# Patient Record
Sex: MALE | Race: WHITE | NOT HISPANIC OR LATINO | Employment: OTHER | URBAN - METROPOLITAN AREA
[De-identification: names, ages, dates, MRNs, and addresses within clinical notes are randomized per-mention and may not be internally consistent; named-entity substitution may affect disease eponyms.]

---

## 2019-08-15 ENCOUNTER — AMBULATORY - HEALTHEAST (OUTPATIENT)
Dept: OTHER | Facility: CLINIC | Age: 64
End: 2019-08-15

## 2019-08-19 ENCOUNTER — AMBULATORY - HEALTHEAST (OUTPATIENT)
Dept: CARDIOLOGY | Facility: CLINIC | Age: 64
End: 2019-08-19

## 2019-08-19 DIAGNOSIS — R00.2 PALPITATIONS: ICD-10-CM

## 2021-05-31 ENCOUNTER — RECORDS - HEALTHEAST (OUTPATIENT)
Dept: ADMINISTRATIVE | Facility: CLINIC | Age: 66
End: 2021-05-31

## 2021-06-01 ENCOUNTER — RECORDS - HEALTHEAST (OUTPATIENT)
Dept: ADMINISTRATIVE | Facility: CLINIC | Age: 66
End: 2021-06-01

## 2021-06-16 PROBLEM — R00.2 PALPITATIONS: Status: ACTIVE | Noted: 2019-08-15

## 2021-06-16 PROBLEM — I10 BENIGN ESSENTIAL HYPERTENSION: Status: ACTIVE | Noted: 2019-08-15

## 2024-10-24 ENCOUNTER — HOSPITAL ENCOUNTER (EMERGENCY)
Facility: CLINIC | Age: 69
Discharge: HOME | End: 2024-10-24
Attending: EMERGENCY MEDICINE | Admitting: EMERGENCY MEDICINE
Payer: COMMERCIAL

## 2024-10-24 ENCOUNTER — APPOINTMENT (OUTPATIENT)
Dept: CT IMAGING | Facility: CLINIC | Age: 69
End: 2024-10-24
Attending: EMERGENCY MEDICINE
Payer: COMMERCIAL

## 2024-10-24 VITALS
RESPIRATION RATE: 20 BRPM | TEMPERATURE: 98.2 F | OXYGEN SATURATION: 95 % | BODY MASS INDEX: 35.94 KG/M2 | DIASTOLIC BLOOD PRESSURE: 80 MMHG | SYSTOLIC BLOOD PRESSURE: 154 MMHG | HEART RATE: 54 BPM | HEIGHT: 74 IN | WEIGHT: 280 LBS

## 2024-10-24 DIAGNOSIS — G89.29 ACUTE ON CHRONIC LOW BACK PAIN: ICD-10-CM

## 2024-10-24 DIAGNOSIS — M54.50 ACUTE ON CHRONIC LOW BACK PAIN: ICD-10-CM

## 2024-10-24 PROBLEM — M54.16 SPINAL STENOSIS OF LUMBAR REGION WITH RADICULOPATHY: Status: ACTIVE | Noted: 2017-07-24

## 2024-10-24 PROBLEM — Z87.81 HISTORY OF VERTEBRAL FRACTURE: Status: ACTIVE | Noted: 2020-12-31

## 2024-10-24 PROBLEM — G62.9 NEUROPATHY: Status: ACTIVE | Noted: 2020-12-31

## 2024-10-24 PROBLEM — I77.811 ABDOMINAL AORTIC ECTASIA (H): Status: ACTIVE | Noted: 2021-03-23

## 2024-10-24 PROBLEM — N40.0 BPH (BENIGN PROSTATIC HYPERPLASIA): Status: ACTIVE | Noted: 2019-03-22

## 2024-10-24 PROBLEM — D47.2 MGUS (MONOCLONAL GAMMOPATHY OF UNKNOWN SIGNIFICANCE): Status: ACTIVE | Noted: 2020-12-31

## 2024-10-24 PROBLEM — Z98.1 S/P LUMBAR SPINAL FUSION: Status: ACTIVE | Noted: 2017-06-01

## 2024-10-24 PROBLEM — M48.061 SPINAL STENOSIS OF LUMBAR REGION WITH RADICULOPATHY: Status: ACTIVE | Noted: 2017-07-24

## 2024-10-24 LAB
ALBUMIN SERPL BCG-MCNC: 4 G/DL (ref 3.5–5.2)
ALP SERPL-CCNC: 54 U/L (ref 40–150)
ALT SERPL W P-5'-P-CCNC: 15 U/L (ref 0–70)
ANION GAP SERPL CALCULATED.3IONS-SCNC: 7 MMOL/L (ref 7–15)
AST SERPL W P-5'-P-CCNC: 16 U/L (ref 0–45)
BASOPHILS # BLD AUTO: 0.1 10E3/UL (ref 0–0.2)
BASOPHILS NFR BLD AUTO: 2 %
BILIRUB SERPL-MCNC: 0.6 MG/DL
BUN SERPL-MCNC: 13.6 MG/DL (ref 8–23)
CALCIUM SERPL-MCNC: 9 MG/DL (ref 8.8–10.4)
CHLORIDE SERPL-SCNC: 103 MMOL/L (ref 98–107)
CREAT SERPL-MCNC: 0.85 MG/DL (ref 0.67–1.17)
EGFRCR SERPLBLD CKD-EPI 2021: >90 ML/MIN/1.73M2
EOSINOPHIL # BLD AUTO: 0.4 10E3/UL (ref 0–0.7)
EOSINOPHIL NFR BLD AUTO: 6 %
ERYTHROCYTE [DISTWIDTH] IN BLOOD BY AUTOMATED COUNT: 12.6 % (ref 10–15)
GLUCOSE SERPL-MCNC: 99 MG/DL (ref 70–99)
HCO3 SERPL-SCNC: 28 MMOL/L (ref 22–29)
HCT VFR BLD AUTO: 39.1 % (ref 40–53)
HGB BLD-MCNC: 13.7 G/DL (ref 13.3–17.7)
IMM GRANULOCYTES # BLD: 0.1 10E3/UL
IMM GRANULOCYTES NFR BLD: 1 %
LYMPHOCYTES # BLD AUTO: 1.8 10E3/UL (ref 0.8–5.3)
LYMPHOCYTES NFR BLD AUTO: 29 %
MCH RBC QN AUTO: 32 PG (ref 26.5–33)
MCHC RBC AUTO-ENTMCNC: 35 G/DL (ref 31.5–36.5)
MCV RBC AUTO: 91 FL (ref 78–100)
MONOCYTES # BLD AUTO: 0.6 10E3/UL (ref 0–1.3)
MONOCYTES NFR BLD AUTO: 10 %
NEUTROPHILS # BLD AUTO: 3.2 10E3/UL (ref 1.6–8.3)
NEUTROPHILS NFR BLD AUTO: 52 %
NRBC # BLD AUTO: 0 10E3/UL
NRBC BLD AUTO-RTO: 0 /100
PLATELET # BLD AUTO: 302 10E3/UL (ref 150–450)
POTASSIUM SERPL-SCNC: 4.1 MMOL/L (ref 3.4–5.3)
PROT SERPL-MCNC: 7.6 G/DL (ref 6.4–8.3)
RBC # BLD AUTO: 4.28 10E6/UL (ref 4.4–5.9)
SODIUM SERPL-SCNC: 138 MMOL/L (ref 135–145)
WBC # BLD AUTO: 6.1 10E3/UL (ref 4–11)

## 2024-10-24 PROCEDURE — 36415 COLL VENOUS BLD VENIPUNCTURE: CPT | Performed by: EMERGENCY MEDICINE

## 2024-10-24 PROCEDURE — 80053 COMPREHEN METABOLIC PANEL: CPT | Performed by: EMERGENCY MEDICINE

## 2024-10-24 PROCEDURE — 250N000011 HC RX IP 250 OP 636: Performed by: EMERGENCY MEDICINE

## 2024-10-24 PROCEDURE — 74177 CT ABD & PELVIS W/CONTRAST: CPT

## 2024-10-24 PROCEDURE — 99285 EMERGENCY DEPT VISIT HI MDM: CPT | Mod: 25 | Performed by: EMERGENCY MEDICINE

## 2024-10-24 PROCEDURE — 250N000009 HC RX 250: Performed by: EMERGENCY MEDICINE

## 2024-10-24 PROCEDURE — 99284 EMERGENCY DEPT VISIT MOD MDM: CPT | Performed by: EMERGENCY MEDICINE

## 2024-10-24 PROCEDURE — 85025 COMPLETE CBC W/AUTO DIFF WBC: CPT | Performed by: EMERGENCY MEDICINE

## 2024-10-24 PROCEDURE — 250N000013 HC RX MED GY IP 250 OP 250 PS 637: Performed by: EMERGENCY MEDICINE

## 2024-10-24 RX ORDER — IBUPROFEN 600 MG/1
600 TABLET, FILM COATED ORAL ONCE
Status: COMPLETED | OUTPATIENT
Start: 2024-10-24 | End: 2024-10-24

## 2024-10-24 RX ORDER — OXYCODONE HYDROCHLORIDE 5 MG/1
5 TABLET ORAL ONCE
Status: COMPLETED | OUTPATIENT
Start: 2024-10-24 | End: 2024-10-24

## 2024-10-24 RX ORDER — ACETAMINOPHEN 500 MG
1000 TABLET ORAL ONCE
Status: COMPLETED | OUTPATIENT
Start: 2024-10-24 | End: 2024-10-24

## 2024-10-24 RX ORDER — OXYCODONE HYDROCHLORIDE 5 MG/1
5 TABLET ORAL EVERY 6 HOURS PRN
Qty: 12 TABLET | Refills: 0 | Status: SHIPPED | OUTPATIENT
Start: 2024-10-24 | End: 2024-10-27

## 2024-10-24 RX ORDER — IOPAMIDOL 755 MG/ML
137 INJECTION, SOLUTION INTRAVASCULAR ONCE
Status: COMPLETED | OUTPATIENT
Start: 2024-10-24 | End: 2024-10-24

## 2024-10-24 RX ADMIN — SODIUM CHLORIDE 74 ML: 9 INJECTION, SOLUTION INTRAVENOUS at 10:44

## 2024-10-24 RX ADMIN — ACETAMINOPHEN 1000 MG: 500 TABLET ORAL at 09:55

## 2024-10-24 RX ADMIN — OXYCODONE HYDROCHLORIDE 5 MG: 5 TABLET ORAL at 11:26

## 2024-10-24 RX ADMIN — IBUPROFEN 600 MG: 600 TABLET, FILM COATED ORAL at 09:55

## 2024-10-24 RX ADMIN — IOPAMIDOL 137 ML: 755 INJECTION, SOLUTION INTRAVENOUS at 10:44

## 2024-10-24 ASSESSMENT — COLUMBIA-SUICIDE SEVERITY RATING SCALE - C-SSRS
2. HAVE YOU ACTUALLY HAD ANY THOUGHTS OF KILLING YOURSELF IN THE PAST MONTH?: NO
1. IN THE PAST MONTH, HAVE YOU WISHED YOU WERE DEAD OR WISHED YOU COULD GO TO SLEEP AND NOT WAKE UP?: NO
6. HAVE YOU EVER DONE ANYTHING, STARTED TO DO ANYTHING, OR PREPARED TO DO ANYTHING TO END YOUR LIFE?: NO

## 2024-10-24 ASSESSMENT — ACTIVITIES OF DAILY LIVING (ADL)
ADLS_ACUITY_SCORE: 0

## 2024-10-24 NOTE — ED TRIAGE NOTES
"Pt presents to ED with c/o LLQ abd pain-started as generalized. Pt describes pain as \"burning\". Pt states pain started about 1 week ago, worsened in the last day. Pt reports nausea-no vomiting. Pt reports felt constipated-took laxative and had results yesterday x 3. Pt also reports hx of diverticulosis. Denies fevers.       Triage Assessment (Adult)       Row Name 10/24/24 0906          Triage Assessment    Airway WDL WDL        Respiratory WDL    Respiratory WDL WDL        Skin Circulation/Temperature WDL    Skin Circulation/Temperature WDL WDL        Cardiac WDL    Cardiac WDL WDL        Peripheral/Neurovascular WDL    Peripheral Neurovascular WDL WDL        Cognitive/Neuro/Behavioral WDL    Cognitive/Neuro/Behavioral WDL WDL                     "

## 2024-10-24 NOTE — DISCHARGE INSTRUCTIONS
Your evaluation emergency room was reassuring.  As we discussed, your prostate had a heterogeneous appearance.  This is something you can further discuss with your primary care provider.  Ibuprofen Tylenol for acute pain.  A spine referral was placed and you should receive a call to schedule appointment if you so choose.If your symptoms worsen or you develop new or concerning symptoms, please return to the Emergency Department for further evaluation and treatment.

## 2024-10-24 NOTE — ED PROVIDER NOTES
"  History     Chief Complaint   Patient presents with    Abdominal Pain     Generalized abd pain-worse in LLQ, described as \"burning\". Present for past week, worsened yesterday into today     HPI  Alfonso Conn is a 69 year old male with history of hypertension, chronic back pain, hyperlipidemia, with 6 days of low back and abdominal pain.  He says that he feels the pain in his left side of his low back/side.  Pain also radiates into his lower abdomen.  Has had back pain for many years.  He had an L4/L5/S1 fusion in 2009 with an anterior revision 1 month later.  Had hardware removal in 2013.  Says he has had back pain for years but this feels different.  Is never had any discomfort in his abdomen.  No recent injury or strain.  No numbness, weakness, change in bowel or bladder function.  No fevers or chills.  Does report nausea.  No chest pain, cough or shortness of breath.  Pain is not influenced by food.  Initially started that he had pain when he sleeps on his left side and would improve through the day.  Now its to the point if he sleeps on his left side or his right or on his back he has pain.  Has been progressive.  Not completely dissipating during the day but certainly improves with activity.  He reports that he had a CT scan earlier this month looking for lytic lesions related to his MGUS.    The patient's PMHx, Surgical Hx, Allergies, and Medications were all reviewed with the patient.    CT whole body scan w/out 10/16/2024  FINDINGS:   HEAD: No suspicious lytic or sclerotic lesions.      NECK: No suspicious lytic or sclerotic lesions. Multilevel degenerative changes in the cervical spine.     CHEST: No suspicious lytic or sclerotic lesions. Degenerative hypertrophic changes in the thoracic spine. A 4 mm left upper lobe nodule on series 3 image 91 is unchanged from 10/12/2023 and can be considered benign. No follow-up needed per Fleischner Society guidelines.     CORONARY ARTERY CALCIFICATION: Extensive "     ABDOMEN: Postoperative and degenerative changes in the lumbar spine. No suspicious lytic lesions. Moderate calcified atheromatous plaque in the abdominal aorta.     PELVIS: No suspicious lytic or sclerotic lesions. Colonic diverticulosis without active inflammation.     MUSCULOSKELETAL: No suspicious lytic or sclerotic lesions.     IMPRESSION:   1. No suspicious lytic or sclerotic lesions.     US Abd Aorta 5/1/2024  FINDINGS: No abdominal aortic aneurysm.  There is mild atheromatous plaque in the abdominal aorta.     MEASUREMENTS:   Proximal Aorta: 2.9 x 2.9 cm.   Mid Aorta: 1.9 x 2.2 cm.   Distal Aorta: 1.9 x 1.6 cm.   Right Common Iliac Artery: 1.2 cm.   Left Common Iliac Artery: 1.1 cm.     IMPRESSION:   1. 2.9 cm upper abdominal aortic ectasia. No mellissa aneurysm.     Allergies:  Allergies   Allergen Reactions    Wellbutrin [Bupropion] Hives     Took more than was prescribed       Problem List:    Patient Active Problem List    Diagnosis Date Noted    Abdominal aortic ectasia (H) 03/23/2021     Priority: Medium     3.1 cm 2021, 2.9 cm 2024, recommended repeat US in 10 years.      History of vertebral fracture 12/31/2020     Priority: Medium     Likely due to trauma (retired )      MGUS (monoclonal gammopathy of unknown significance) 12/31/2020     Priority: Medium     surveillance every 12 months with CBC, BMP and SPEP and provider visit.      Neuropathy 12/31/2020     Priority: Medium     Saw neurology. No underlying cause, except for possible association with MGUS. Cymbalta is helping. Side effects from gabapentin and pregabalin. Could add amitriptyline if needed.      Palpitations 08/15/2019     Priority: Medium    Benign essential hypertension 08/15/2019     Priority: Medium    BPH (benign prostatic hyperplasia) 03/22/2019     Priority: Medium    Spinal stenosis of lumbar region with radiculopathy 07/24/2017     Priority: Medium    S/P lumbar spinal fusion 06/01/2017     Priority: Medium     "Chronic back pain 12/29/2016     Priority: Medium    Fatty liver 11/06/2012     Priority: Medium    HLD (hyperlipidemia) 11/06/2012     Priority: Medium    HTN (hypertension) 11/06/2012     Priority: Medium    CHELA (obstructive sleep apnea) 10/11/2010     Priority: Medium     Mild CHELA (obstructive sleep apnea)          Past Medical History:    No past medical history on file.    Past Surgical History:    No past surgical history on file.    Family History:    No family history on file.    Social History:  Marital Status:   [2]  Social History     Tobacco Use    Smoking status: Never    Smokeless tobacco: Never   Substance Use Topics    Alcohol use: Never    Drug use: Never        Medications:    cetirizine (ZYRTEC) 10 MG tablet  fluticasone propionate (FLONASE) 50 mcg/actuation nasal spray  ibuprofen (ADVIL,MOTRIN) 200 MG tablet  irbesartan (AVAPRO) 300 MG tablet  oxyCODONE (ROXICODONE) 5 MG tablet  terazosin (HYTRIN) 1 MG capsule          Review of Systems  Pertinent positives and negatives mentioned in HPI    Physical Exam   BP: (!) 152/86  Pulse: 74  Temp: 98.2  F (36.8  C)  Resp: 20  Height: 188 cm (6' 2\")  Weight: 127 kg (280 lb)  SpO2: 94 %    GEN: Awake, alert, and cooperative. No acute distress.  Resting comfortably  CV : Extremities warm and well-perfused  PULM: Normal effort.  Speaking in full sentences with no audible wheezing  ABD: Soft, non-tender, non-distended. No rebound or guarding.   BACK: No midline spinal tenderness to palpation.  Tenderness to the left of midline at level of lumbar spine.  No CVA tenderness.  No edema, erythema, or ecchymosis.  NEURO: Following commands in all extremities.  5/5 strength in bilateral hip flexion/extension, knee flexion/extension, ankle plantar/dorsiflexion.  Sensation intact to light touch.  No sensory loss in perineum.    EXT: No gross deformity. Warm and well perfused.  INT: Warm. No diaphoresis. Normal color.        ED Course        Procedures            "      Critical Care time:  none              Results for orders placed or performed during the hospital encounter of 10/24/24 (from the past 24 hours)   CBC with platelets differential    Narrative    The following orders were created for panel order CBC with platelets differential.  Procedure                               Abnormality         Status                     ---------                               -----------         ------                     CBC with platelets and d...[[id number]]  Abnormal            Final result                 Please view results for these tests on the individual orders.   Comprehensive metabolic panel   Result Value Ref Range    Sodium 138 135 - 145 mmol/L    Potassium 4.1 3.4 - 5.3 mmol/L    Carbon Dioxide (CO2) 28 22 - 29 mmol/L    Anion Gap 7 7 - 15 mmol/L    Urea Nitrogen 13.6 8.0 - 23.0 mg/dL    Creatinine 0.85 0.67 - 1.17 mg/dL    GFR Estimate >90 >60 mL/min/1.73m2    Calcium 9.0 8.8 - 10.4 mg/dL    Chloride 103 98 - 107 mmol/L    Glucose 99 70 - 99 mg/dL    Alkaline Phosphatase 54 40 - 150 U/L    AST 16 0 - 45 U/L    ALT 15 0 - 70 U/L    Protein Total 7.6 6.4 - 8.3 g/dL    Albumin 4.0 3.5 - 5.2 g/dL    Bilirubin Total 0.6 <=1.2 mg/dL   CBC with platelets and differential   Result Value Ref Range    WBC Count 6.1 4.0 - 11.0 10e3/uL    RBC Count 4.28 (L) 4.40 - 5.90 10e6/uL    Hemoglobin 13.7 13.3 - 17.7 g/dL    Hematocrit 39.1 (L) 40.0 - 53.0 %    MCV 91 78 - 100 fL    MCH 32.0 26.5 - 33.0 pg    MCHC 35.0 31.5 - 36.5 g/dL    RDW 12.6 10.0 - 15.0 %    Platelet Count 302 150 - 450 10e3/uL    % Neutrophils 52 %    % Lymphocytes 29 %    % Monocytes 10 %    % Eosinophils 6 %    % Basophils 2 %    % Immature Granulocytes 1 %    NRBCs per 100 WBC 0 <1 /100    Absolute Neutrophils 3.2 1.6 - 8.3 10e3/uL    Absolute Lymphocytes 1.8 0.8 - 5.3 10e3/uL    Absolute Monocytes 0.6 0.0 - 1.3 10e3/uL    Absolute Eosinophils 0.4 0.0 - 0.7 10e3/uL    Absolute Basophils 0.1 0.0 - 0.2 10e3/uL     Absolute Immature Granulocytes 0.1 <=0.4 10e3/uL    Absolute NRBCs 0.0 10e3/uL   CT Abdomen Pelvis w Contrast    Narrative    CT ABDOMEN AND PELVIS WITH CONTRAST 10/24/2024 11:03 AM    CLINICAL HISTORY: Left-sided flank/low back pain and lower abdominal  pain.    TECHNIQUE: CT scan of the abdomen and pelvis was performed following  injection of IV contrast. Multiplanar reformats were obtained. Dose  reduction techniques were used.  CONTRAST: 137mL Isovue-370    COMPARISON: None.    FINDINGS:   LOWER CHEST: Small hiatal hernia.    HEPATOBILIARY: Normal.    PANCREAS: Normal.    SPLEEN: Normal.    ADRENAL GLANDS: Normal.    KIDNEYS/BLADDER: Normal.    BOWEL: Normal appendix. Colonic diverticula without evidence for  diverticulitis. No free fluid or free air.    PELVIC ORGANS: Heterogeneous prostate that is 3.5 cm. No acute pelvic  abnormality identified. Tiny cyst suggested at the left pelvis of  questionable significance measuring 11 mm, series 3 image 199.    ADDITIONAL FINDINGS: Scattered vascular calcifications. No adenopathy  identified.    MUSCULOSKELETAL: Spine degenerative changes. L5-S1 fusion.      Impression    IMPRESSION:   1.  No acute abnormality identified.  2.  Colonic diverticula.  3.  Heterogeneous prostate. Correlate with any underlying prostate  disease.       Medications   iopamidol (ISOVUE-370) solution 137 mL (137 mLs Intravenous $Given 10/24/24 1044)   sodium chloride 0.9 % bag 500mL for CT scan flush use (74 mLs Intravenous $Given 10/24/24 1044)   ibuprofen (ADVIL/MOTRIN) tablet 600 mg (600 mg Oral $Given 10/24/24 0955)   acetaminophen (TYLENOL) tablet 1,000 mg (1,000 mg Oral $Given 10/24/24 0955)   oxyCODONE (ROXICODONE) tablet 5 mg (5 mg Oral $Given 10/24/24 1126)       Assessments & Plan (with Medical Decision Making)   69 year old male with past medical history of chronic low back pain status post lumbar sacral fusion, revision, hardware removal, hypertension hyperlipidemia, abdominal  aortic ectasia, diverticulosis, with 1 week of progressive but intermittent left-sided low back/flank pain and lower abdominal pain associate with nausea.    Reassuring abdominal exam.  Radiated CBC without leukocytosis or anemia.  CMP normal.  No urinary symptoms.  CT scan of abdomen pelvis without any acute process to explain his symptoms.  Does have heterogeneous appearing prostate.  Said he did have prostate resection about 20 years ago.  Had PSA checked last year and was low.  My suspicion is that the pain is coming from his back past on the temporal nature of his symptoms and that there improve when he is up and walking around it hurts more when he lies down.  No changes in bowel or bladder function.  No saddle anesthesia.  No numbness or weakness or sensory change.  Do not feel that urgent MRI is needed.  Lab work is reassuring.  Pain managed with ibuprofen Tylenol and oxycodone.     Would like to see spine and  referral made.  12 tablets of oxycodone sent for severe pain.  Okay for ibuprofen for short-term use only.  Follow-up and ED return precautions discussed.  He expressed agreement and understanding of plan         I have reviewed the nursing notes.         Discharge Medication List as of 10/24/2024  1:25 PM        START taking these medications    Details   oxyCODONE (ROXICODONE) 5 MG tablet Take 1 tablet (5 mg) by mouth every 6 hours as needed for pain., Disp-12 tablet, R-0, E-Prescribe             Final diagnoses:   Acute on chronic low back pain     Joshua Rivera MD        10/24/2024   [facility] EMERGENCY DEPT    Disclaimer: This note consists of words and symbols derived from keyboarding and dictation using voice recognition software.  As a result, there may be errors that have gone undetected.  Please consider this when interpreting information found in this note.               Joshua Rivera MD  10/24/24 1156

## 2024-11-14 ENCOUNTER — OFFICE VISIT (OUTPATIENT)
Dept: PHYSICAL MEDICINE AND REHAB | Facility: CLINIC | Age: 69
End: 2024-11-14
Attending: EMERGENCY MEDICINE
Payer: COMMERCIAL

## 2024-11-14 VITALS
BODY MASS INDEX: 36.45 KG/M2 | DIASTOLIC BLOOD PRESSURE: 76 MMHG | WEIGHT: 284 LBS | HEIGHT: 74 IN | SYSTOLIC BLOOD PRESSURE: 145 MMHG | HEART RATE: 73 BPM

## 2024-11-14 DIAGNOSIS — M54.6 THORACIC SPINE PAIN: ICD-10-CM

## 2024-11-14 DIAGNOSIS — R20.2 ARM PARESTHESIA, RIGHT: ICD-10-CM

## 2024-11-14 DIAGNOSIS — M54.14 RADICULAR PAIN OF THORACIC REGION: ICD-10-CM

## 2024-11-14 DIAGNOSIS — M48.04 THORACIC STENOSIS: ICD-10-CM

## 2024-11-14 DIAGNOSIS — R20.2 PARESTHESIA OF FOOT, BILATERAL: ICD-10-CM

## 2024-11-14 DIAGNOSIS — M48.04 FORAMINAL STENOSIS OF THORACIC REGION: ICD-10-CM

## 2024-11-14 DIAGNOSIS — M48.062 SPINAL STENOSIS OF LUMBAR REGION WITH NEUROGENIC CLAUDICATION: Primary | ICD-10-CM

## 2024-11-14 DIAGNOSIS — M43.16 SPONDYLOLISTHESIS OF LUMBAR REGION: ICD-10-CM

## 2024-11-14 DIAGNOSIS — M47.816 LUMBAR FACET ARTHROPATHY: ICD-10-CM

## 2024-11-14 DIAGNOSIS — M54.2 CERVICAL SPINE PAIN: ICD-10-CM

## 2024-11-14 ASSESSMENT — PAIN SCALES - GENERAL: PAINLEVEL_OUTOF10: MODERATE PAIN (4)

## 2024-11-14 NOTE — PATIENT INSTRUCTIONS
An MRI was ordered for you today.  You will be contacted by scheduling within 3 days.    If you are not contacted, please call Radiology at [phone].     Schedule an EMG (nerve test) with Dr Mckeon.

## 2024-11-14 NOTE — LETTER
11/14/2024      Alfonso Conn  [address]  [place] [address]      Dear Colleague,    Thank you for referring your patient, Alfonso Conn, to the [facility]. Please see a copy of my visit note below.    Assessment/Plan:      Yannick was seen today for back pain.    Diagnoses and all orders for this visit:    Spinal stenosis of lumbar region with neurogenic claudication  -     MR Lumbar Spine w/o Contrast; Future    Lumbar facet arthropathy  -     MR Lumbar Spine w/o Contrast; Future    Spondylolisthesis of lumbar region  -     MR Lumbar Spine w/o Contrast; Future    Paresthesia of foot, bilateral  -     MR Thoracic Spine w/o Contrast; Future  -     MR Lumbar Spine w/o Contrast; Future  -     MR Cervical Spine w/o Contrast; Future  -     EMG; Future    Thoracic stenosis  -     MR Thoracic Spine w/o Contrast; Future    Thoracic spine pain  -     MR Thoracic Spine w/o Contrast; Future    Radicular pain of thoracic region  -     MR Thoracic Spine w/o Contrast; Future    Foraminal stenosis of thoracic region  -     MR Thoracic Spine w/o Contrast; Future    Cervical spine pain  -     MR Cervical Spine w/o Contrast; Future    Arm paresthesia, right  -     MR Cervical Spine w/o Contrast; Future    Other orders  -     Spine  Referral         Assessment: Pleasant 69 year old male with a history of hypertension, hyperlipidemia, MGUS with:    1.  Chronic lumbar spine pain status post L4-S1 anterior posterior fusion with hardware removal.  Having persistent lumbar spine pain lumbosacral junction PSIS with foot paresthesias to the mid tibias bilaterally but this is more consistent with claudication symptoms may have a component of peripheral polyneuropathy as well given MGUS.  He has right greater than left lower extremity radicular pain.  Grade 1 spondylolisthesis L3 and L4 adjacent level of the fusion with facet arthropathy moderate on CT abdomen and pelvis likely moderate to severe  central stenosis.    2.  Left thoracic radicular pain prompting his visit to the emergency department he has severe bilateral foraminal stenosis T11-12 and left T  10-11.  Likely T10 radicular pain to the umbilicus that is improving.  Lower extremity paresthesias could also be related to central stenosis which measures 6 mm for me today at T10-11 as he has normal reflexes in the lower extremities which may indicate spinal cord compression.    3.  Chronic cervical spine pain with right upper extremity paresthesias.  Chronic neck pain for several years paresthesias in the right arm intermittently for the past year.  Question central stenosis contributing to the lower extremity symptoms.  I have no imaging of the cervical spine.      Discussion:    1.  We discussed the diagnosis and treatment options.  Has had multiple treatments in the past including his reported lumbar injections gabapentin Lyrica and now duloxetine which is helpful for his peripheral neuropathy symptoms and back pain.  He has multilevel foraminal stenosis and lumbar stenosis also central stenosis in the thoracic spine I suspect cervical stenosis contributing to his leg symptoms and generalized pain.  I have no new imaging other than CT abdomen and pelvis.    2.  MRI of the cervical thoracic and lumbar spine evaluate the extent of central stenosis throughout his neural axis.  I suspect his lower extremity reflexes being normal at the ankles are in fact hyperreflexia potentially related to cervical or thoracic myelopathy.    3.  EMG bilateral lower extremities to evaluate for peripheral polyneuropathy versus lumbar radiculopathy     4.  Follow-up earliest convenience for EMG.      It was our pleasure caring for your patient today, if there any questions or concerns please do not hesitate to contact us.      Subjective:   Patient ID: Alfonso Conn is a 69 year old male.    History of Present Illness: Patient presents at the request of the emergency  department for an evaluation of lumbar spine pain which is chronic but also thoracic and cervical spine pain and leg paresthesias.  He tells me he has had a history of lumbar spine faint lower extremity radicular pain worsening over time subsequently found to have a spondylolisthesis anterior posterior fusion by Dr. Bandar velazco with hardware removal around 2013 still having severe back pain since then tried numerous treatments at [facility] including extensive physical therapy and lumbar injections medications.  Tried gabapentin and Lyrica and currently on duloxetine which gives him 30 to 50% relief of his back pain but also has foot paresthesias both feet up to the mid tibias.  Has MGUS feels that this is neuropathy but reports an EMG was negative in the past for neuropathy.    Or prompted this visit is persistent back pain at the lumbar spine at the lumbosacral junction and the PSIS bilaterally and gluteal region.  This is worse with standing and walking and his foot paresthesias from the feet up to the mid tibias worse with standing and walking as well as at night burning pain intermittently with sense of cold.  Walking with a shopping cart tends to help his back pain better with sitting but his back pain is constant 9/10 at worst 4/10 today 3/10 at best.    He also has thoracic spine pain thoracolumbar junction with radiating pain intermittently anteriorly to the umbilicus and prior to his ER visit a month ago was having left flank and radicular pain to the umbilicus that has improved some after getting treatment at the ER with oxycodone.  No longer taking oxycodone.    Also has chronic cervical spine pain cervical thoracic junction of the cervical spine and 1 year of right arm paresthesias intermittently.      Imaging: CT abdomen and pelvis from October 24, 2024 reviewed to evaluate the lumbar spine.  Status post L5-S1 fusion anteriorly.  Disc height loss L4-5 with bony ankylosis across the disc space  laterally.  Grade 1 spondylolisthesis L3 and L4 with severe facet arthropathy.  Vascular calcifications.  Hips show mild to moderate osteoarthritis left hip mild right.  Subchondral cyst formation in the femoral head neck region.  On the left.  Multiple lateral osteophytes throughout the lower thoracic spine.  At least moderate central stenosis L3-4.  Has severe bilateral foraminal stenosis at T11-12 left at T10-11 and central canal measuring 6 mm for me today at T10-11 approximate 12 mm at other levels more cephalad.    I reviewed MRI report from [facility] as well no imaging available.  Multilevel mild compression deformities 5 to 10% in the thoracic spine.  Severe foraminal stenosis T10-11 on the left also moderate central stenosis I believe.  Lumbar spine shows facet arthropathy moderate L3-4 with moderate to severe central stenosis.  Prior L4-S1 fusion anteriorly          Review of Systems: Complains of weight gain, sexual dysfunction, ringing in the ears, chest pain, palpitations, cough, abdominal pain, constipation, reflux, joint pain, muscle pain, muscle fatigue, skin itching, dizziness, skin bruising, insomnia and excessive tiredness.  Denies fever, headache, change in vision, foot swelling, shortness of breath, diarrhea, bowel or bladder incontinence, nonhealing wounds, poor balance, hemophilia, depression. Remainder of 12 point review systems negative unless listed above.      Current Outpatient Medications   Medication Sig Dispense Refill     cetirizine (ZYRTEC) 10 MG tablet [CETIRIZINE (ZYRTEC) 10 MG TABLET] Take 10 mg by mouth daily.       fluticasone propionate (FLONASE) 50 mcg/actuation nasal spray [FLUTICASONE PROPIONATE (FLONASE) 50 MCG/ACTUATION NASAL SPRAY] 1 spray into each nostril daily as needed.       ibuprofen (ADVIL,MOTRIN) 200 MG tablet [IBUPROFEN (ADVIL,MOTRIN) 200 MG TABLET] Take 600 mg by mouth every 8 (eight) hours as needed for pain.       irbesartan (AVAPRO) 300 MG tablet  [IRBESARTAN (AVAPRO) 300 MG TABLET] Take 300 mg by mouth daily.       terazosin (HYTRIN) 1 MG capsule [TERAZOSIN (HYTRIN) 1 MG CAPSULE] Take 3 mg by mouth at bedtime.       No current facility-administered medications for this visit.       Past Medical History:   Diagnosis Date     Hyperlipidemia      Hypertension      MGUS (monoclonal gammopathy of unknown significance)        Family History   Problem Relation Age of Onset     Cerebrovascular Disease Mother      Cancer Mother      Cancer Brother          Social History     Socioeconomic History     Marital status:      Spouse name: None     Number of children: None     Years of education: None     Highest education level: None   Tobacco Use     Smoking status: Never     Smokeless tobacco: Never   Substance and Sexual Activity     Alcohol use: Never     Drug use: Never       The following portions of the patient's history were reviewed and updated as appropriate: allergies, current medications, past family history, past medical history, past social history, past surgical history and problem list.    Oswestry (LOUIS) Questionnaire        11/11/2024     8:44 AM   OSWESTRY DISABILITY INDEX   Count 10    Sum 14    Oswestry Score (%) 28 %        Patient-reported       Neck Disability Index:      11/11/2024     8:47 AM   Neck Disability Index (  Bryant H. and Jasmina C. 1991. All rights reserved.; used with permission)   SECTION 1 - PAIN INTENSITY 2    SECTION 2 - PERSONAL CARE 0    SECTION 3 - LIFTING 0    SECTION 4 - READING 1    SECTION 5 - HEADACHES 1    SECTION 6 - CONCENTRATION 1    SECTION 7 - WORK 0    SECTION 8 - DRIVING 1    SECTION 9 - SLEEPING 4    SECTION 10 - RECREATION 1    Count 10    Sum 11    Raw Score: /50 11    Neck Disability Index Score: (%) 22 %        Patient-reported       Social history: .  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     PHQ-2 Score:         11/14/2024     8:27 AM   PHQ-2 ( 1999 [employer])   Q1: Little  "interest or pleasure in doing things 0   Q2: Feeling down, depressed or hopeless 0   PHQ-2 Score 0                  Objective:   Physical Exam:    BP (!) 145/76   Pulse 73   Ht 6' 2\" (1.88 m)   Wt 284 lb (128.8 kg)   BMI 36.46 kg/m    Body mass index is 36.46 kg/m .    General:  Well-appearing male in no acute distress.  Pleasant,   cooperative, and interactive throughout the examination and interview.  CV: No lower extremity edema.  2+ bilateral lower extremity peripheral pulses posterior tibial arteries bilaterally lymphatics: No cervical lymphadenopathy palpated.  Eyes: sclera clear.  Skin: No rashes or lesions seen.  Respirations unlabored.  MSK: Gait is nonantalgic.  Able to heel-toe   stand without difficulty.    Negative Romberg.  Spine: normal AP curves of the C, T, and L spine.  Mildly reduced lumbar spine range of motion flexion and finger to floor testing extension in neutral with increased pain/facet loading.  Palpation: Tenderness to palpation over PSIS no tenderness over the gluteal tissues.  Extremities: Full range of motion of the   elbows, and wrists with no effusions or tenderness to palpation.    Full range of motion of the hips, knees, and ankles from a seated position with no effusions or tenderness to palpation.    Neurologic exam: Mental status: Patient is alert and oriented with normal affect.  Attention, knowledge, memory, and language are intact.  Normal coordination throughout the examination.  Reflexes are 2+ and symmetric biceps, triceps, brachioradialis, patellar, and Achilles with down-going toes and Negative Moses's.  Sensation is intact to light touch throughout the upper and lower extremities bilaterally.  Manual muscle testing reveals 5 out of 5 strength in the  elbow   flexors/extensors, wrist extensors, interosseous, and finger flexors; 5 out of 5   in the hip flexors, knee flexors/extensors, ankle plantar flexors, ankle   dorsiflexors, and EHL.  Normal muscle bulk and " tone.  Negative seated   straight leg raise bilaterally.                Again, thank you for allowing me to participate in the care of your patient.        Sincerely,        Alexsander Mckeon, DO

## 2024-11-24 ENCOUNTER — HOSPITAL ENCOUNTER (OUTPATIENT)
Dept: MRI IMAGING | Facility: CLINIC | Age: 69
Discharge: HOME | End: 2024-11-24
Attending: PHYSICAL MEDICINE & REHABILITATION | Admitting: PHYSICAL MEDICINE & REHABILITATION
Payer: COMMERCIAL

## 2024-11-24 DIAGNOSIS — R20.2 PARESTHESIA OF FOOT, BILATERAL: ICD-10-CM

## 2024-11-24 DIAGNOSIS — M43.16 SPONDYLOLISTHESIS OF LUMBAR REGION: ICD-10-CM

## 2024-11-24 DIAGNOSIS — M54.2 CERVICAL SPINE PAIN: ICD-10-CM

## 2024-11-24 DIAGNOSIS — R20.2 ARM PARESTHESIA, RIGHT: ICD-10-CM

## 2024-11-24 DIAGNOSIS — M48.04 FORAMINAL STENOSIS OF THORACIC REGION: ICD-10-CM

## 2024-11-24 DIAGNOSIS — M47.816 LUMBAR FACET ARTHROPATHY: ICD-10-CM

## 2024-11-24 DIAGNOSIS — M54.6 THORACIC SPINE PAIN: ICD-10-CM

## 2024-11-24 DIAGNOSIS — M48.062 SPINAL STENOSIS OF LUMBAR REGION WITH NEUROGENIC CLAUDICATION: ICD-10-CM

## 2024-11-24 DIAGNOSIS — M48.04 THORACIC STENOSIS: ICD-10-CM

## 2024-11-24 DIAGNOSIS — M54.14 RADICULAR PAIN OF THORACIC REGION: ICD-10-CM

## 2024-11-24 PROCEDURE — 72141 MRI NECK SPINE W/O DYE: CPT

## 2024-11-24 PROCEDURE — 72146 MRI CHEST SPINE W/O DYE: CPT

## 2024-11-24 PROCEDURE — 72148 MRI LUMBAR SPINE W/O DYE: CPT

## 2024-12-08 ENCOUNTER — HEALTH MAINTENANCE LETTER (OUTPATIENT)
Age: 69
End: 2024-12-08

## 2024-12-19 ENCOUNTER — OFFICE VISIT (OUTPATIENT)
Dept: PHYSICAL MEDICINE AND REHAB | Facility: CLINIC | Age: 69
End: 2024-12-19
Attending: PHYSICAL MEDICINE & REHABILITATION
Payer: COMMERCIAL

## 2024-12-19 VITALS — SYSTOLIC BLOOD PRESSURE: 141 MMHG | DIASTOLIC BLOOD PRESSURE: 72 MMHG | HEART RATE: 71 BPM

## 2024-12-19 DIAGNOSIS — M48.04 FORAMINAL STENOSIS OF THORACIC REGION: ICD-10-CM

## 2024-12-19 DIAGNOSIS — R20.2 PARESTHESIA OF FOOT, BILATERAL: ICD-10-CM

## 2024-12-19 DIAGNOSIS — M48.04 THORACIC SPINAL STENOSIS: ICD-10-CM

## 2024-12-19 DIAGNOSIS — M43.16 SPONDYLOLISTHESIS OF LUMBAR REGION: ICD-10-CM

## 2024-12-19 DIAGNOSIS — M48.062 SPINAL STENOSIS OF LUMBAR REGION WITH NEUROGENIC CLAUDICATION: ICD-10-CM

## 2024-12-19 DIAGNOSIS — M47.816 LUMBAR FACET ARTHROPATHY: ICD-10-CM

## 2024-12-19 DIAGNOSIS — M48.02 CERVICAL STENOSIS OF SPINAL CANAL: ICD-10-CM

## 2024-12-19 DIAGNOSIS — M54.14 RADICULAR PAIN OF THORACIC REGION: Primary | ICD-10-CM

## 2024-12-19 ASSESSMENT — PAIN SCALES - GENERAL: PAINLEVEL_OUTOF10: MODERATE PAIN (5)

## 2024-12-19 NOTE — PROGRESS NOTES
[facility]  [address] [place] [address] [address]  [place] [address]  Office: [phone] Fax: [phone]    Electromyography and Nerve Conduction Study Report        Indication: Patient presents at the request of Dr. Alexsander Mckeon for bilateral lower extremity EMG.  He has low back pain gluteal pain with foot numbness and tingling bilateral feet to the ankles.  History of MGUS.  On exam, normal sensation to light touch to lower extremities, 2+ patellar and Achilles reflexes bilaterally with downgoing toes, normal muscle strength of the major muscle groups of the bilateral lower extremities.        Pt Exam Discussion (Communication Barriers):  Electromyography and nerve conduction testing, including associated discomfort, risks, benefits, and alternatives was discussed with the patient prior to the procedure.  No learning/ communication barriers; patient verbalized understanding of procedure.  Informed consent was obtained.           Pt Assessment:  Testing was successfully completed; patient tolerated testing well.       Blood Thinners: None Skin Temperature: Warmed 31.4                   EMG/NCS  results:     Nerve Conduction Studies  Motor Sites      Segment Distal Latency Neg. Amp CV F-Latency F-Estimate Comment   Site  (ms) (mV) (m/s) (ms) (ms)    Left Fibular (EDB) Motor   Ankle Ankle-EDB 4.6 3.6       Knee Knee-Ankle 15.8 3.1 *40      Right Fibular (EDB) Motor   Ankle Ankle-EDB 4.6 3.7       Knee Knee-Ankle 15.2 3.7 *41      Left Tibial (AH) Motor   Ankle Ankle-AH 4.1 4.9       Knee Knee-Ankle 15.8 *2.4 42      Right Tibial (AH) Motor   Ankle Ankle-AH 3.5 7.2       Knee Knee-Ankle 14.9 *2.9 42      Right Ulnar (ADM) Motor   Wrist  3.1 11.6       Bel Elbow Bel Elbow-Wrist 7.5 10.0 58      Ab Elbow Ab Elbow-Wrist 9.6 10.0 59        Sensory Sites      Onset Lat Peak Lat Amp CV Comment   Site (ms) (ms) ( V) (m/s)    Right Radial Sensory   Forearm-Wrist 2.2 *2.8 *13 45    Left Sural  Sensory   B-Ankle *NR *NR *NR *NR    Right Sural Sensory   B-Ankle *NR *NR *NR *NR        NCS Waveforms:    Motor                  Sensory             Electromyography     Side Muscle Nerve Root Ins Act Fibs Psw Fasc Recrt Dur Amp Poly Comment   Right AntTibialis Dp Br Fibular L4-5 Nml Nml Nml Nml Nml Nml Nml 0    Right Gastroc Tibial S1-2 Nml Nml Nml Nml Nml Nml Nml 0    Right Fibularis Long Sup Br Fibular L5-S1 Nml Nml Nml Nml Nml Nml Nml 0    Right VastusLat Femoral L2-4 Nml Nml Nml Nml Nml Nml Nml 0    Right TensorFascLat SupGluteal L4-5, S1 Nml Nml Nml Nml Nml Nml Nml 0    Left AntTibialis Dp Br Fibular L4-5 Nml Nml Nml Nml Nml Nml Nml 0    Left Gastroc Tibial S1-2 Nml Nml Nml Nml Nml Nml Nml 0    Left Fibularis Long Sup Br Fibular L5-S1 Nml Nml Nml Nml Nml Nml Nml 0    Left VastusLat Femoral L2-4 Nml Nml Nml Nml Nml Nml Nml 0    Left RectFemoris Femoral L2-4 Nml Nml Nml Nml Nml Nml Nml 0          Comment NCS: Abnormal study  1.  Absent bilateral sural SNAPs and low amplitude right radial SNAP.  2.  Normal bilateral peroneal and tibial CMAP's.    Comment EMG: Normal study  1.  Normal needle EMG bilateral lower extremities.     Interpretation: Abnormal study: There is electrodiagnostic evidence of:    1.  Absent bilateral sural SNAPs and low amplitude right radial SNAP.  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  Affecting the upper extremity SNAPs.  Not affecting lower extremity CMAP's at this time.      2.  There is no electrodiagnostic evidence of lumbosacral radiculopathy, lumbosacral plexopathy, or focal neuropathy in the bilateral lower extremities.     The testing was completed in its entirety by the physician.      It was our pleasure caring for your patient today, if there any questions or concerns please do not hesitate to contact us.

## 2024-12-19 NOTE — PATIENT INSTRUCTIONS
A thoracic epidural injection has been ordered today. Please schedule this injection at least  2 weeks from now to allow time for insurance prior authorization. On the day of your injection, you cannot be sick or taking antibiotics. If you become sick and are prescribed, please call the clinic so your injection can be rescheduled for once you have completed your antibiotics.  It is recommended that you do not have a vaccination within 2 weeks of your procedure if it will contain steroids, as this may make the vaccination less effective.  You will need to bring a  with you for your injection. If you have any questions or concerns prior to your injection, please do not hesitate to call the nurse navigation line at [phone].

## 2024-12-19 NOTE — LETTER
12/19/2024      Alfonso Conn  [address]  [place] [address]      Dear Colleague,    Thank you for referring your patient, Alfonso Conn, to the [facility]. Please see a copy of my visit note below.    Assessment/Plan:      Yannick was seen today for emg.    Diagnoses and all orders for this visit:    Radicular pain of thoracic region  -     PAIN Interlaminar Epidural Steroid Injection Thoracic; Future    Paresthesia of foot, bilateral  -     EMG  -     SD NEEDLE EMG EA EXTREMTY W/PARASPINAL AREA COMPLETE  -     SD NCS MOTOR W OR W/O F-WAVE, 7 OR 8    Foraminal stenosis of thoracic region    Thoracic spinal stenosis  -     PAIN Interlaminar Epidural Steroid Injection Thoracic; Future    Spinal stenosis of lumbar region with neurogenic claudication    Lumbar facet arthropathy    Spondylolisthesis of lumbar region    Cervical stenosis of spinal canal         Assessment: Pleasant 69 year old male with a history of hypertension, hyperlipidemia, MGUS with:     1.   Left greater than right thoracic radicular pain prompting his visit to the emergency department he has severe bilateral foraminal stenosis T11-12 and left T  10-11.  Likely T10 radicular pain to the umbilicus that is improving but still having significant intermittent pain bilaterally.  Lower extremity paresthesias could also be related to central stenosis which measures 6 mm at T10-11 as he has normal reflexes in the lower extremities which may indicate spinal cord compression.  He has moderate central stenosis at T10-11 with significant foraminal stenosis T10-11 T11-12.    2. Chronic lumbar spine pain status post L4-S1 anterior posterior fusion with hardware removal.  Having persistent lumbar spine pain lumbosacral junction PSIS with foot paresthesias to the mid tibias bilaterally but this is more consistent with claudication symptoms may have a component of peripheral polyneuropathy as well given MGUS.  He has right  greater than left lower extremity radicular pain.  Grade 1 spondylolisthesis L3 and L4 adjacent level of the fusion with facet arthropathy moderate on CT abdomen and pelvis.  New MRI reveals severe central stenosis with serpiginous nerve roots that L3-4 with spondylolisthesis.     3.  Chronic cervical spine pain with right upper extremity paresthesias.  Chronic neck pain for several years paresthesias in the right arm intermittently for the past year.  Fairly significant degenerative changes at C5-6 and C6-7 no high-grade central stenosis.  There is moderate stenosis.     4.  Bilateral lower extremity paresthesias has findings consistent with sensory neuropathy on EMG but also could be related to lumbar spinal stenosis as well as the thoracic stenosis.      Discussion:    1.  We discussed the stenosis in the thoracic spine and lumbar spine.  Thoracic radicular pain is more significant than the lumbar radicular pain at this point and we will start with interventions for the thoracic spine that may also give some lumbar relief.    2.  Recommend T12-L1 versus T11-12 interlaminar epidural steroid injection based on the amount of space available around the spinous process.  This will be for thoracic and hopefully lumbar spine pain related to the stenosis.    3.  Continue duloxetine for now.    4.  Can consider lumbar epidural steroid injection.    5.  Monitor signs of neuropathy.  Some of his foot paresthesias could be related to the thoracic and lumbar stenosis.    6.  Reassurance  provided for the cervical spine will monitor as his stenosis is relatively mild/moderate.  Can consider facet medial branch blocks in the future.    7.  Follow-up at his earliest convenience for injection.      It was our pleasure caring for your patient today, if there any questions or concerns please do not hesitate to contact us.    Over 30  minutes were spent on the date of the encounter performing chart review, patient visit and  documentation in addition to any procedure.      Subjective:   Patient ID: Alfonso Conn is a 69 year old male.    History of Present Illness: Patient presents for follow-up of thoracic spine pain lumbar spine pain bilateral lower extremity paresthesias also some cervical spine pain to a lesser degree.  He is here after MRIs of his neural axis along with EMG.  His pain is worse to the thoracolumbar region radiating around anteriorly intermittently worse at night when trying to sleep or laying on his back better with duloxetine also the paresthesias in his feet are issues as well as low back pain and buttock pain bilaterally.  Duloxetine is very helpful.    EMG: Findings consistent with a sensory or sensorimotor peripheral polyneuropathy, this is affecting sensory nerves in the upper extremities.  No lumbar radiculopathy on EMG.    Imaging: MRI report and images were personally reviewed and discussed with the patient.  A plastic model was utilized during the discussion.  MRI of the cervical thoracic and lumbar spine images personally reviewed.  Cervical spine shows some degenerative changes mid cervical spine with facet arthropathy with varying amounts of foraminal stenosis most severe at C5-6 bilaterally and C6-7 no high-grade central stenosis.    Thoracic spine shows degenerative changes T9-10 and T10-11.  Moderate central stenosis at T10-11 along with moderate severe bilateral foraminal stenosis T11-12 T10-11 T9-10.    Lumbar MRI reveals fusion L4-S1 above the fusion there is a spondylolisthesis at L3-4 with facet arthropathy results in severe central stenosis and moderate severe foraminal stenosis.  There are serpiginous nerve roots.    Lumbar spine shows prior lumbar fusion    Review of Systems: Pertinent positives: Paresthesias..  Pertinent negatives: No  weakness.  No bowel or bladder incontinence.  No urinary retention.  No fevers, unintentional weight loss, balance changes, headaches, frequent falling,  difficulty swallowing, or coordination difficulties.  All others reviewed are negative.           Current Outpatient Medications   Medication Sig Dispense Refill     cetirizine (ZYRTEC) 10 MG tablet [CETIRIZINE (ZYRTEC) 10 MG TABLET] Take 10 mg by mouth daily.       fluticasone propionate (FLONASE) 50 mcg/actuation nasal spray [FLUTICASONE PROPIONATE (FLONASE) 50 MCG/ACTUATION NASAL SPRAY] 1 spray into each nostril daily as needed.       ibuprofen (ADVIL,MOTRIN) 200 MG tablet [IBUPROFEN (ADVIL,MOTRIN) 200 MG TABLET] Take 600 mg by mouth every 8 (eight) hours as needed for pain.       irbesartan (AVAPRO) 300 MG tablet [IRBESARTAN (AVAPRO) 300 MG TABLET] Take 300 mg by mouth daily.       terazosin (HYTRIN) 1 MG capsule [TERAZOSIN (HYTRIN) 1 MG CAPSULE] Take 3 mg by mouth at bedtime.       No current facility-administered medications for this visit.       Past Medical History:   Diagnosis Date     Hyperlipidemia      Hypertension      MGUS (monoclonal gammopathy of unknown significance)        The following portions of the patient's history were reviewed and updated as appropriate: allergies, current medications, past family history, past medical history, past social history, past surgical history and problem list.           Objective:   Physical Exam:    BP (!) 141/72   Pulse 71   There is no height or weight on file to calculate BMI.      General: Alert and oriented with normal affect. Attention, knowledge, memory, and language are intact. No acute distress.   Eyes: Sclerae are clear.  Respirations: Unlabored. CV: No lower extremity edema.   Gait:  Nonantalgic    Sensation is intact to light touch throughout the upper and lower extremities.  Reflexes are 2+ and symmetric in the biceps triceps and brachioradialis with negative Hoffmans. 2+ patellar and Achilles with downgoing toes.    Manual muscle testing reveals:  Right /Left out of 5     5/5 hip flexors  5/5 knee flexors  5/5 knee extensors  5/5 ankle plantar  flexors  5/5 ankle dorsiflexors  5/5  [facility]  [address]  [address]  [place] [address]  Office: [phone] Fax: [phone]    Electromyography and Nerve Conduction Study Report        Indication: Patient presents at the request of Dr. Alexsander Mckeon for bilateral lower extremity EMG.  He has low back pain gluteal pain with foot numbness and tingling bilateral feet to the ankles.  History of MGUS.  On exam, normal sensation to light touch to lower extremities, 2+ patellar and Achilles reflexes bilaterally with downgoing toes, normal muscle strength of the major muscle groups of the bilateral lower extremities.        Pt Exam Discussion (Communication Barriers):  Electromyography and nerve conduction testing, including associated discomfort, risks, benefits, and alternatives was discussed with the patient prior to the procedure.  No learning/ communication barriers; patient verbalized understanding of procedure.  Informed consent was obtained.           Pt Assessment:  Testing was successfully completed; patient tolerated testing well.       Blood Thinners: None Skin Temperature: Warmed 31.4                   EMG/NCS  results:     Nerve Conduction Studies  Motor Sites      Segment Distal Latency Neg. Amp CV F-Latency F-Estimate Comment   Site  (ms) (mV) (m/s) (ms) (ms)    Left Fibular (EDB) Motor   Ankle Ankle-EDB 4.6 3.6       Knee Knee-Ankle 15.8 3.1 *40      Right Fibular (EDB) Motor   Ankle Ankle-EDB 4.6 3.7       Knee Knee-Ankle 15.2 3.7 *41      Left Tibial (AH) Motor   Ankle Ankle-AH 4.1 4.9       Knee Knee-Ankle 15.8 *2.4 42      Right Tibial (AH) Motor   Ankle Ankle-AH 3.5 7.2       Knee Knee-Ankle 14.9 *2.9 42      Right Ulnar (ADM) Motor   Wrist  3.1 11.6       Bel Elbow Bel Elbow-Wrist 7.5 10.0 58      Ab Elbow Ab Elbow-Wrist 9.6 10.0 59        Sensory Sites      Onset Lat Peak Lat Amp CV Comment   Site (ms) (ms) ( V) (m/s)    Right Radial Sensory    Forearm-Wrist 2.2 *2.8 *13 45    Left Sural Sensory   B-Ankle *NR *NR *NR *NR    Right Sural Sensory   B-Ankle *NR *NR *NR *NR        NCS Waveforms:    Motor                  Sensory             Electromyography     Side Muscle Nerve Root Ins Act Fibs Psw Fasc Recrt Dur Amp Poly Comment   Right AntTibialis Dp Br Fibular L4-5 Nml Nml Nml Nml Nml Nml Nml 0    Right Gastroc Tibial S1-2 Nml Nml Nml Nml Nml Nml Nml 0    Right Fibularis Long Sup Br Fibular L5-S1 Nml Nml Nml Nml Nml Nml Nml 0    Right VastusLat Femoral L2-4 Nml Nml Nml Nml Nml Nml Nml 0    Right TensorFascLat SupGluteal L4-5, S1 Nml Nml Nml Nml Nml Nml Nml 0    Left AntTibialis Dp Br Fibular L4-5 Nml Nml Nml Nml Nml Nml Nml 0    Left Gastroc Tibial S1-2 Nml Nml Nml Nml Nml Nml Nml 0    Left Fibularis Long Sup Br Fibular L5-S1 Nml Nml Nml Nml Nml Nml Nml 0    Left VastusLat Femoral L2-4 Nml Nml Nml Nml Nml Nml Nml 0    Left RectFemoris Femoral L2-4 Nml Nml Nml Nml Nml Nml Nml 0          Comment NCS: Abnormal study  1.  Absent bilateral sural SNAPs and low amplitude right radial SNAP.  2.  Normal bilateral peroneal and tibial CMAP's.    Comment EMG: Normal study  1.  Normal needle EMG bilateral lower extremities.     Interpretation: Abnormal study: There is electrodiagnostic evidence of:    1.  Absent bilateral sural SNAPs and low amplitude right radial SNAP.  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  Affecting the upper extremity SNAPs.  Not affecting lower extremity CMAP's at this time.      2.  There is no electrodiagnostic evidence of lumbosacral radiculopathy, lumbosacral plexopathy, or focal neuropathy in the bilateral lower extremities.     The testing was completed in its entirety by the physician.      It was our pleasure caring for your patient today, if there any questions or concerns please do not hesitate to contact us.        Again, thank you for allowing me to  participate in the care of your patient.        Sincerely,        Alexsander Mckeon, DO

## 2024-12-19 NOTE — PROGRESS NOTES
Assessment/Plan:      Yannick was seen today for emg.    Diagnoses and all orders for this visit:    Radicular pain of thoracic region  -     PAIN Interlaminar Epidural Steroid Injection Thoracic; Future    Paresthesia of foot, bilateral  -     EMG  -     WV NEEDLE EMG EA EXTREMTY W/PARASPINAL AREA COMPLETE  -     WV NCS MOTOR W OR W/O F-WAVE, 7 OR 8    Foraminal stenosis of thoracic region    Thoracic spinal stenosis  -     PAIN Interlaminar Epidural Steroid Injection Thoracic; Future    Spinal stenosis of lumbar region with neurogenic claudication    Lumbar facet arthropathy    Spondylolisthesis of lumbar region    Cervical stenosis of spinal canal         Assessment: Pleasant 69 year old male with a history of hypertension, hyperlipidemia, MGUS with:     1.   Left greater than right thoracic radicular pain prompting his visit to the emergency department he has severe bilateral foraminal stenosis T11-12 and left T  10-11.  Likely T10 radicular pain to the umbilicus that is improving but still having significant intermittent pain bilaterally.  Lower extremity paresthesias could also be related to central stenosis which measures 6 mm at T10-11 as he has normal reflexes in the lower extremities which may indicate spinal cord compression.  He has moderate central stenosis at T10-11 with significant foraminal stenosis T10-11 T11-12.    2. Chronic lumbar spine pain status post L4-S1 anterior posterior fusion with hardware removal.  Having persistent lumbar spine pain lumbosacral junction PSIS with foot paresthesias to the mid tibias bilaterally but this is more consistent with claudication symptoms may have a component of peripheral polyneuropathy as well given MGUS.  He has right greater than left lower extremity radicular pain.  Grade 1 spondylolisthesis L3 and L4 adjacent level of the fusion with facet arthropathy moderate on CT abdomen and pelvis.  New MRI reveals severe central stenosis with serpiginous nerve roots  that L3-4 with spondylolisthesis.     3.  Chronic cervical spine pain with right upper extremity paresthesias.  Chronic neck pain for several years paresthesias in the right arm intermittently for the past year.  Fairly significant degenerative changes at C5-6 and C6-7 no high-grade central stenosis.  There is moderate stenosis.     4.  Bilateral lower extremity paresthesias has findings consistent with sensory neuropathy on EMG but also could be related to lumbar spinal stenosis as well as the thoracic stenosis.      Discussion:    1.  We discussed the stenosis in the thoracic spine and lumbar spine.  Thoracic radicular pain is more significant than the lumbar radicular pain at this point and we will start with interventions for the thoracic spine that may also give some lumbar relief.    2.  Recommend T12-L1 versus T11-12 interlaminar epidural steroid injection based on the amount of space available around the spinous process.  This will be for thoracic and hopefully lumbar spine pain related to the stenosis.    3.  Continue duloxetine for now.    4.  Can consider lumbar epidural steroid injection.    5.  Monitor signs of neuropathy.  Some of his foot paresthesias could be related to the thoracic and lumbar stenosis.    6.  Reassurance  provided for the cervical spine will monitor as his stenosis is relatively mild/moderate.  Can consider facet medial branch blocks in the future.    7.  Follow-up at his earliest convenience for injection.      It was our pleasure caring for your patient today, if there any questions or concerns please do not hesitate to contact us.    Over 30  minutes were spent on the date of the encounter performing chart review, patient visit and documentation in addition to any procedure.      Subjective:   Patient ID: Alfonso Conn is a 69 year old male.    History of Present Illness: Patient presents for follow-up of thoracic spine pain lumbar spine pain bilateral lower extremity  paresthesias also some cervical spine pain to a lesser degree.  He is here after MRIs of his neural axis along with EMG.  His pain is worse to the thoracolumbar region radiating around anteriorly intermittently worse at night when trying to sleep or laying on his back better with duloxetine also the paresthesias in his feet are issues as well as low back pain and buttock pain bilaterally.  Duloxetine is very helpful.    EMG: Findings consistent with a sensory or sensorimotor peripheral polyneuropathy, this is affecting sensory nerves in the upper extremities.  No lumbar radiculopathy on EMG.    Imaging: MRI report and images were personally reviewed and discussed with the patient.  A plastic model was utilized during the discussion.  MRI of the cervical thoracic and lumbar spine images personally reviewed.  Cervical spine shows some degenerative changes mid cervical spine with facet arthropathy with varying amounts of foraminal stenosis most severe at C5-6 bilaterally and C6-7 no high-grade central stenosis.    Thoracic spine shows degenerative changes T9-10 and T10-11.  Moderate central stenosis at T10-11 along with moderate severe bilateral foraminal stenosis T11-12 T10-11 T9-10.    Lumbar MRI reveals fusion L4-S1 above the fusion there is a spondylolisthesis at L3-4 with facet arthropathy results in severe central stenosis and moderate severe foraminal stenosis.  There are serpiginous nerve roots.    Lumbar spine shows prior lumbar fusion    Review of Systems: Pertinent positives: Paresthesias..  Pertinent negatives: No  weakness.  No bowel or bladder incontinence.  No urinary retention.  No fevers, unintentional weight loss, balance changes, headaches, frequent falling, difficulty swallowing, or coordination difficulties.  All others reviewed are negative.           Current Outpatient Medications   Medication Sig Dispense Refill    cetirizine (ZYRTEC) 10 MG tablet [CETIRIZINE (ZYRTEC) 10 MG TABLET] Take 10 mg by  mouth daily.      fluticasone propionate (FLONASE) 50 mcg/actuation nasal spray [FLUTICASONE PROPIONATE (FLONASE) 50 MCG/ACTUATION NASAL SPRAY] 1 spray into each nostril daily as needed.      ibuprofen (ADVIL,MOTRIN) 200 MG tablet [IBUPROFEN (ADVIL,MOTRIN) 200 MG TABLET] Take 600 mg by mouth every 8 (eight) hours as needed for pain.      irbesartan (AVAPRO) 300 MG tablet [IRBESARTAN (AVAPRO) 300 MG TABLET] Take 300 mg by mouth daily.      terazosin (HYTRIN) 1 MG capsule [TERAZOSIN (HYTRIN) 1 MG CAPSULE] Take 3 mg by mouth at bedtime.       No current facility-administered medications for this visit.       Past Medical History:   Diagnosis Date    Hyperlipidemia     Hypertension     MGUS (monoclonal gammopathy of unknown significance)        The following portions of the patient's history were reviewed and updated as appropriate: allergies, current medications, past family history, past medical history, past social history, past surgical history and problem list.           Objective:   Physical Exam:    BP (!) 141/72   Pulse 71   There is no height or weight on file to calculate BMI.      General: Alert and oriented with normal affect. Attention, knowledge, memory, and language are intact. No acute distress.   Eyes: Sclerae are clear.  Respirations: Unlabored. CV: No lower extremity edema.   Gait:  Nonantalgic    Sensation is intact to light touch throughout the upper and lower extremities.  Reflexes are 2+ and symmetric in the biceps triceps and brachioradialis with negative Hoffmans. 2+ patellar and Achilles with downgoing toes.    Manual muscle testing reveals:  Right /Left out of 5     5/5 hip flexors  5/5 knee flexors  5/5 knee extensors  5/5 ankle plantar flexors  5/5 ankle dorsiflexors  5/5  EHL

## 2025-01-15 ENCOUNTER — RADIOLOGY INJECTION OFFICE VISIT (OUTPATIENT)
Dept: PHYSICAL MEDICINE AND REHAB | Facility: CLINIC | Age: 70
End: 2025-01-15
Attending: PHYSICAL MEDICINE & REHABILITATION
Payer: COMMERCIAL

## 2025-01-15 VITALS
SYSTOLIC BLOOD PRESSURE: 142 MMHG | DIASTOLIC BLOOD PRESSURE: 72 MMHG | WEIGHT: 284 LBS | RESPIRATION RATE: 18 BRPM | OXYGEN SATURATION: 98 % | HEART RATE: 59 BPM | HEIGHT: 74 IN | TEMPERATURE: 98.2 F | BODY MASS INDEX: 36.45 KG/M2

## 2025-01-15 DIAGNOSIS — M54.14 RADICULAR PAIN OF THORACIC REGION: ICD-10-CM

## 2025-01-15 DIAGNOSIS — M48.04 THORACIC SPINAL STENOSIS: ICD-10-CM

## 2025-01-15 PROCEDURE — 62321 NJX INTERLAMINAR CRV/THRC: CPT | Performed by: PAIN MEDICINE

## 2025-01-15 RX ORDER — DEXAMETHASONE SODIUM PHOSPHATE 10 MG/ML
INJECTION, SOLUTION INTRAMUSCULAR; INTRAVENOUS
Status: COMPLETED | OUTPATIENT
Start: 2025-01-15 | End: 2025-01-15

## 2025-01-15 RX ORDER — LIDOCAINE HYDROCHLORIDE 10 MG/ML
INJECTION, SOLUTION EPIDURAL; INFILTRATION; INTRACAUDAL; PERINEURAL
Status: COMPLETED | OUTPATIENT
Start: 2025-01-15 | End: 2025-01-15

## 2025-01-15 RX ADMIN — DEXAMETHASONE SODIUM PHOSPHATE 10 MG: 10 INJECTION, SOLUTION INTRAMUSCULAR; INTRAVENOUS at 13:55

## 2025-01-15 RX ADMIN — LIDOCAINE HYDROCHLORIDE 1 ML: 10 INJECTION, SOLUTION EPIDURAL; INFILTRATION; INTRACAUDAL; PERINEURAL at 13:55

## 2025-01-15 ASSESSMENT — PAIN SCALES - GENERAL
PAINLEVEL_OUTOF10: MODERATE PAIN (4)
PAINLEVEL_OUTOF10: SEVERE PAIN (6)

## 2025-01-15 NOTE — PATIENT INSTRUCTIONS
DISCHARGE INSTRUCTIONS    During office hours (8:00 a.m.- 4:00 p.m.) questions or concerns may be answered  by calling [facility] Navigation Nurses at  [phone].  Messages received after hours will be returned the following business day.      In the case of an emergency, please dial 911 or seek assistance at the nearest Emergency Room/Urgent Care facility.     All Patients:    You may experience an increase in your symptoms for the first 2 days (It may take anywhere between 2 days- 2 weeks for the steroid to have maximum effect).    You may use ice on the injection site, as frequently as 20 minutes each hour if needed.    You may take your pain medicine.    You may continue taking your regular medication after your injection. If you have had a Medial Branch Block you may resume pain medication once your pain diary is completed.    You may shower. No swimming, tub bath or hot tub for 48 hours.  You may remove your bandaid/bandage as soon as you are home.    You may resume light activities, as tolerated.    Resume your usual diet as tolerated.    If you were told to hold any blood thinning medications you may resume taking them 24 hours after your procedure as prescribed.    It is strongly advised that you do not drive for 1-3 hours post injection.    If you have had oral sedation:  Do not drive for 8 hours post injection.      If you have had IV sedation:  Do not drive for 24 hours post injection.  Do not operate hazardous machinery or make important personal/business decisions for 24 hours.      POSSIBLE STEROID SIDE EFFECTS (If steroid/cortisone was used for your procedure)    -If you experience these symptoms, it should only last for a short period    Swelling of the legs              Skin redness (flushing)     Mouth (oral) irritation   Blood sugar (glucose) levels            Sweats                    Mood changes  Headache  Sleeplessness  Weakened immune system for up to 14 days, which could increase  the risk of tiago the COVID-19 virus and/or experiencing more severe symptoms of the disease, if exposed.  Decreased effectiveness of the flu vaccine if given within 2 weeks of the steroid.         POSSIBLE PROCEDURE SIDE EFFECTS  -Call the Spine Center if you are concerned  Increased Pain           Increased numbness/tingling      Nausea/Vomiting          Bruising/bleeding at site      Redness or swelling                                              Difficulty walking      Weakness           Fever greater than 100.5    *In the event of a severe headache after an epidural steroid injection that is relieved by lying down, please call the [facility] to speak with a clinical staff member*

## 2025-02-03 ENCOUNTER — HOSPITAL ENCOUNTER (EMERGENCY)
Facility: CLINIC | Age: 70
Discharge: HOME | End: 2025-02-03
Attending: PHYSICIAN ASSISTANT | Admitting: PHYSICIAN ASSISTANT
Payer: COMMERCIAL

## 2025-02-03 VITALS
DIASTOLIC BLOOD PRESSURE: 71 MMHG | TEMPERATURE: 97.4 F | RESPIRATION RATE: 16 BRPM | OXYGEN SATURATION: 95 % | HEART RATE: 81 BPM | SYSTOLIC BLOOD PRESSURE: 161 MMHG

## 2025-02-03 DIAGNOSIS — J01.90 ACUTE SINUSITIS WITH SYMPTOMS > 10 DAYS: ICD-10-CM

## 2025-02-03 PROCEDURE — G0463 HOSPITAL OUTPT CLINIC VISIT: HCPCS | Performed by: PHYSICIAN ASSISTANT

## 2025-02-03 PROCEDURE — 99213 OFFICE O/P EST LOW 20 MIN: CPT | Performed by: PHYSICIAN ASSISTANT

## 2025-02-03 ASSESSMENT — ENCOUNTER SYMPTOMS
CONSTITUTIONAL NEGATIVE: 1
SINUS PRESSURE: 1
SINUS PAIN: 1

## 2025-02-03 ASSESSMENT — COLUMBIA-SUICIDE SEVERITY RATING SCALE - C-SSRS
2. HAVE YOU ACTUALLY HAD ANY THOUGHTS OF KILLING YOURSELF IN THE PAST MONTH?: NO
6. HAVE YOU EVER DONE ANYTHING, STARTED TO DO ANYTHING, OR PREPARED TO DO ANYTHING TO END YOUR LIFE?: NO
1. IN THE PAST MONTH, HAVE YOU WISHED YOU WERE DEAD OR WISHED YOU COULD GO TO SLEEP AND NOT WAKE UP?: NO

## 2025-02-03 NOTE — ED TRIAGE NOTES
Bilateral sinus pain for 4 weeks has seen a dentist to make sure its not his teeth and it is not they suggested a sinus infection.  Fifi Mitchell MA

## 2025-02-03 NOTE — ED PROVIDER NOTES
History     Chief Complaint   Patient presents with    Sinusitis     HPI  Alfonso Conn is a 69 year old male who presents to Urgent Care with complaints of ongoing facial pain, sinus pressure and congestion, postnasal drainage for the past 4 weeks.  His symptoms started with left upper dental pain.  He states he saw his dentist as this was a prior tooth that had a crown.  His dentist states that his tooth looked normal.  Patient has since developed pain to the right upper teeth as well along with associated sinus symptoms.  Concern for sinus infection now.  No history of frequent sinus infections.  Denies fevers, chills, cough, ausea, vomiting, diarrhea, abdominal pain, chest pain, or shortness of breath.      Allergies:  Allergies   Allergen Reactions    Amlodipine Confusion, Other (See Comments) and Unknown    Atorvastatin Other (See Comments)    Chlorthalidone Other (See Comments)     Shortness of breath, difficulty thinking, full-body pain, fatigue    Gabapentin Other (See Comments)     Difficulty concentrating    Lisinopril Cough    Losartan Potassium-Hctz Other (See Comments)     Mouth tingling; leg weakness; slurred words    Metoprolol Other (See Comments)    Pravastatin Muscle Pain (Myalgia) and Other (See Comments)    Bupropion Hives and Rash     Took more than was prescribed    Wellbutrin       Problem List:    Patient Active Problem List    Diagnosis Date Noted    Abdominal aortic ectasia 03/23/2021     Priority: Medium     3.1 cm 2021, 2.9 cm 2024, recommended repeat US in 10 years.      History of vertebral fracture 12/31/2020     Priority: Medium     Likely due to trauma (retired )      MGUS (monoclonal gammopathy of unknown significance) 12/31/2020     Priority: Medium     surveillance every 12 months with CBC, BMP and SPEP and provider visit.      Neuropathy 12/31/2020     Priority: Medium     Saw neurology. No underlying cause, except for possible association with MGUS. Cymbalta  is helping. Side effects from gabapentin and pregabalin. Could add amitriptyline if needed.      Palpitations 08/15/2019     Priority: Medium    Benign essential hypertension 08/15/2019     Priority: Medium    BPH (benign prostatic hyperplasia) 03/22/2019     Priority: Medium    Spinal stenosis of lumbar region with radiculopathy 07/24/2017     Priority: Medium    S/P lumbar spinal fusion 06/01/2017     Priority: Medium    Chronic back pain 12/29/2016     Priority: Medium    Fatty liver 11/06/2012     Priority: Medium    HLD (hyperlipidemia) 11/06/2012     Priority: Medium    HTN (hypertension) 11/06/2012     Priority: Medium    CHELA (obstructive sleep apnea) 10/11/2010     Priority: Medium     Mild CHELA (obstructive sleep apnea)          Past Medical History:    Past Medical History:   Diagnosis Date    Hyperlipidemia     Hypertension     MGUS (monoclonal gammopathy of unknown significance)        Past Surgical History:    No past surgical history on file.    Family History:    Family History   Problem Relation Age of Onset    Cerebrovascular Disease Mother     Cancer Mother     Cancer Brother        Social History:  Marital Status:   [2]  Social History     Tobacco Use    Smoking status: Never    Smokeless tobacco: Never   Substance Use Topics    Alcohol use: Never    Drug use: Never        Medications:    amoxicillin-clavulanate (AUGMENTIN) 875-125 MG tablet  cetirizine (ZYRTEC) 10 MG tablet  fluticasone propionate (FLONASE) 50 mcg/actuation nasal spray  ibuprofen (ADVIL,MOTRIN) 200 MG tablet  irbesartan (AVAPRO) 300 MG tablet  terazosin (HYTRIN) 1 MG capsule          Review of Systems   Constitutional: Negative.    HENT:  Positive for congestion, postnasal drip, sinus pressure and sinus pain.    All other systems reviewed and are negative.      Physical Exam   BP: (!) 161/71  Pulse: 81  Temp: 97.4  F (36.3  C)  Resp: 16  SpO2: 95 %      Physical Exam  Constitutional:       General: He is not in acute  distress.     Appearance: Normal appearance. He is well-developed. He is not ill-appearing, toxic-appearing or diaphoretic.   HENT:      Head: Normocephalic and atraumatic.      Right Ear: Tympanic membrane, ear canal and external ear normal.      Left Ear: Tympanic membrane, ear canal and external ear normal.      Nose: Congestion present. No rhinorrhea.      Right Sinus: Maxillary sinus tenderness present.      Left Sinus: Maxillary sinus tenderness present.      Mouth/Throat:      Lips: Pink.      Mouth: Mucous membranes are moist.      Dentition: No dental tenderness, gingival swelling, dental caries, dental abscesses or gum lesions.      Pharynx: Oropharynx is clear. Uvula midline. Postnasal drip present. No pharyngeal swelling, oropharyngeal exudate, posterior oropharyngeal erythema or uvula swelling.      Tonsils: No tonsillar exudate or tonsillar abscesses.   Eyes:      Extraocular Movements: Extraocular movements intact.      Conjunctiva/sclera: Conjunctivae normal.      Pupils: Pupils are equal, round, and reactive to light.   Cardiovascular:      Rate and Rhythm: Normal rate and regular rhythm.      Heart sounds: Normal heart sounds.   Pulmonary:      Effort: Pulmonary effort is normal. No respiratory distress.      Breath sounds: Normal breath sounds. No stridor. No wheezing, rhonchi or rales.   Musculoskeletal:      Cervical back: Full passive range of motion without pain, normal range of motion and neck supple. No rigidity. Normal range of motion.   Lymphadenopathy:      Cervical: No cervical adenopathy.   Skin:     General: Skin is warm and dry.   Neurological:      Mental Status: He is alert and oriented to person, place, and time.   Psychiatric:         Behavior: Behavior is cooperative.         ED Course        Procedures    No results found for this or any previous visit (from the past 24 hours).    Medications - No data to display    Assessments & Plan (with Medical Decision Making)     Pt is a  69 year old male who presents to Urgent Care with complaints of ongoing facial pain, sinus pressure and congestion, postnasal drainage for the past 4 weeks.  His symptoms started with left upper dental pain.  He states he saw his dentist as this was a prior tooth that had a crown.  His dentist states that his tooth looked normal.  Patient has since developed pain to the right upper teeth as well along with associated sinus symptoms.  Concern for sinus infection now.      Pt is afebrile on arrival.  Exam as above.  Encouraged additional symptomatic treatments at home.  Return precautions were reviewed.  Hand-outs were provided.    Patient was sent with Augmentin and was instructed to follow-up with PCP for continued care and management.  He is to return to the ED for persistent and/or worsening symptoms.  Patient expressed understanding of the diagnosis and plan and was discharged home in good condition.    I have reviewed the nursing notes.    I have reviewed the findings, diagnosis, plan and need for follow up with the patient.    Discharge Medication List as of 2/3/2025 12:52 PM        START taking these medications    Details   amoxicillin-clavulanate (AUGMENTIN) 875-125 MG tablet Take 1 tablet by mouth 2 times daily for 10 days., Disp-20 tablet, R-0, E-Prescribe             Final diagnoses:   Acute sinusitis with symptoms > 10 days       2/3/2025   [facility] EMERGENCY DEPT      Disclaimer:  This note consists of symbols derived from keyboarding, dictation and/or voice recognition software.  As a result, there may be errors in the script that have gone undetected.  Please consider this when interpreting information found in this chart.     Marva Vazquez PA-C  02/03/25 2050

## 2025-04-08 ENCOUNTER — APPOINTMENT (OUTPATIENT)
Dept: ULTRASOUND IMAGING | Facility: CLINIC | Age: 70
DRG: 728 | End: 2025-04-08
Attending: EMERGENCY MEDICINE
Payer: COMMERCIAL

## 2025-04-08 ENCOUNTER — HOSPITAL ENCOUNTER (EMERGENCY)
Facility: CLINIC | Age: 70
Discharge: HOME | DRG: 728 | End: 2025-04-08
Attending: EMERGENCY MEDICINE | Admitting: EMERGENCY MEDICINE
Payer: COMMERCIAL

## 2025-04-08 DIAGNOSIS — N50.811 TESTICULAR PAIN, RIGHT: ICD-10-CM

## 2025-04-08 DIAGNOSIS — N30.00 ACUTE CYSTITIS WITHOUT HEMATURIA: ICD-10-CM

## 2025-04-08 DIAGNOSIS — N45.1 EPIDIDYMITIS: ICD-10-CM

## 2025-04-08 LAB
ALBUMIN UR-MCNC: NEGATIVE MG/DL
ANION GAP SERPL CALCULATED.3IONS-SCNC: 8 MMOL/L (ref 7–15)
APPEARANCE UR: CLEAR
BACTERIA #/AREA URNS HPF: ABNORMAL /HPF
BASOPHILS # BLD AUTO: 0.1 10E3/UL (ref 0–0.2)
BASOPHILS NFR BLD AUTO: 1 %
BILIRUB UR QL STRIP: NEGATIVE
BUN SERPL-MCNC: 15.2 MG/DL (ref 8–23)
CALCIUM SERPL-MCNC: 8.9 MG/DL (ref 8.8–10.4)
CHLORIDE SERPL-SCNC: 104 MMOL/L (ref 98–107)
COLOR UR AUTO: YELLOW
CREAT SERPL-MCNC: 0.9 MG/DL (ref 0.67–1.17)
EGFRCR SERPLBLD CKD-EPI 2021: >90 ML/MIN/1.73M2
EOSINOPHIL # BLD AUTO: 0.2 10E3/UL (ref 0–0.7)
EOSINOPHIL NFR BLD AUTO: 2 %
ERYTHROCYTE [DISTWIDTH] IN BLOOD BY AUTOMATED COUNT: 12.6 % (ref 10–15)
GLUCOSE SERPL-MCNC: 103 MG/DL (ref 70–99)
GLUCOSE UR STRIP-MCNC: NEGATIVE MG/DL
HCO3 SERPL-SCNC: 25 MMOL/L (ref 22–29)
HCT VFR BLD AUTO: 37.1 % (ref 40–53)
HGB BLD-MCNC: 12.7 G/DL (ref 13.3–17.7)
HGB UR QL STRIP: NEGATIVE
IMM GRANULOCYTES # BLD: 0.1 10E3/UL
IMM GRANULOCYTES NFR BLD: 0 %
KETONES UR STRIP-MCNC: NEGATIVE MG/DL
LEUKOCYTE ESTERASE UR QL STRIP: ABNORMAL
LYMPHOCYTES # BLD AUTO: 1.4 10E3/UL (ref 0.8–5.3)
LYMPHOCYTES NFR BLD AUTO: 11 %
MCH RBC QN AUTO: 31.1 PG (ref 26.5–33)
MCHC RBC AUTO-ENTMCNC: 34.2 G/DL (ref 31.5–36.5)
MCV RBC AUTO: 91 FL (ref 78–100)
MONOCYTES # BLD AUTO: 1.2 10E3/UL (ref 0–1.3)
MONOCYTES NFR BLD AUTO: 10 %
MUCOUS THREADS #/AREA URNS LPF: PRESENT /LPF
NEUTROPHILS # BLD AUTO: 9.4 10E3/UL (ref 1.6–8.3)
NEUTROPHILS NFR BLD AUTO: 76 %
NITRATE UR QL: NEGATIVE
NRBC # BLD AUTO: 0 10E3/UL
NRBC BLD AUTO-RTO: 0 /100
PH UR STRIP: 6.5 [PH] (ref 5–7)
PLATELET # BLD AUTO: 282 10E3/UL (ref 150–450)
POTASSIUM SERPL-SCNC: 4 MMOL/L (ref 3.4–5.3)
RBC # BLD AUTO: 4.09 10E6/UL (ref 4.4–5.9)
RBC URINE: 4 /HPF
SODIUM SERPL-SCNC: 137 MMOL/L (ref 135–145)
SP GR UR STRIP: 1.02 (ref 1–1.03)
UROBILINOGEN UR STRIP-MCNC: NORMAL MG/DL
WBC # BLD AUTO: 12.4 10E3/UL (ref 4–11)
WBC URINE: 26 /HPF

## 2025-04-08 PROCEDURE — 36415 COLL VENOUS BLD VENIPUNCTURE: CPT | Performed by: EMERGENCY MEDICINE

## 2025-04-08 PROCEDURE — 250N000013 HC RX MED GY IP 250 OP 250 PS 637: Performed by: EMERGENCY MEDICINE

## 2025-04-08 PROCEDURE — 99284 EMERGENCY DEPT VISIT MOD MDM: CPT | Mod: 25 | Performed by: EMERGENCY MEDICINE

## 2025-04-08 PROCEDURE — 82947 ASSAY GLUCOSE BLOOD QUANT: CPT | Performed by: EMERGENCY MEDICINE

## 2025-04-08 PROCEDURE — 81001 URINALYSIS AUTO W/SCOPE: CPT | Performed by: EMERGENCY MEDICINE

## 2025-04-08 PROCEDURE — 87086 URINE CULTURE/COLONY COUNT: CPT | Performed by: EMERGENCY MEDICINE

## 2025-04-08 PROCEDURE — 76870 US EXAM SCROTUM: CPT

## 2025-04-08 PROCEDURE — 99284 EMERGENCY DEPT VISIT MOD MDM: CPT | Performed by: EMERGENCY MEDICINE

## 2025-04-08 PROCEDURE — 85041 AUTOMATED RBC COUNT: CPT | Performed by: EMERGENCY MEDICINE

## 2025-04-08 RX ORDER — IBUPROFEN 600 MG/1
600 TABLET, FILM COATED ORAL ONCE
Status: COMPLETED | OUTPATIENT
Start: 2025-04-08 | End: 2025-04-08

## 2025-04-08 RX ORDER — OXYCODONE HYDROCHLORIDE 5 MG/1
5 TABLET ORAL EVERY 6 HOURS PRN
Qty: 12 TABLET | Refills: 0 | Status: ON HOLD | OUTPATIENT
Start: 2025-04-08 | End: 2025-04-13

## 2025-04-08 RX ORDER — LEVOFLOXACIN 500 MG/1
500 TABLET, FILM COATED ORAL DAILY
Qty: 14 TABLET | Refills: 0 | Status: ON HOLD | OUTPATIENT
Start: 2025-04-08 | End: 2025-04-13

## 2025-04-08 RX ORDER — OXYCODONE HYDROCHLORIDE 5 MG/1
5 TABLET ORAL ONCE
Status: COMPLETED | OUTPATIENT
Start: 2025-04-08 | End: 2025-04-08

## 2025-04-08 RX ADMIN — OXYCODONE 5 MG: 5 TABLET ORAL at 08:10

## 2025-04-08 RX ADMIN — IBUPROFEN 600 MG: 600 TABLET, FILM COATED ORAL at 08:10

## 2025-04-08 ASSESSMENT — ACTIVITIES OF DAILY LIVING (ADL)
ADLS_ACUITY_SCORE: 41

## 2025-04-08 NOTE — ED PROVIDER NOTES
History     Chief Complaint   Patient presents with    Groin Swelling     HPI  Alfonso Conn is a 70 year old male with history of obesity, BPH, hypertension, with 2 days of right testicular pain and swelling and absence of trauma.  Reports a few months ago he had a urinary tract infection and since that time he experiences some urinary urgency.  Yesterday he had some mild discomfort in his right testicle and last night they said it was very painful and swollen and red.  Pain interfered with his sleep.  No dysuria or fevers.  Did feel chilled last evening.  No nausea or vomiting.  No abdominal pain.    The patient's PMHx, Surgical Hx, Allergies, and Medications were all reviewed with the patient.    Allergies:  Allergies   Allergen Reactions    Amlodipine Confusion, Other (See Comments) and Unknown    Atorvastatin Other (See Comments)    Chlorthalidone Other (See Comments)     Shortness of breath, difficulty thinking, full-body pain, fatigue    Gabapentin Other (See Comments)     Difficulty concentrating    Lisinopril Cough    Losartan Potassium-Hctz Other (See Comments)     Mouth tingling; leg weakness; slurred words    Metoprolol Other (See Comments)    Pravastatin Muscle Pain (Myalgia) and Other (See Comments)    Bupropion Hives and Rash     Took more than was prescribed    Wellbutrin       Problem List:    Patient Active Problem List    Diagnosis Date Noted    Abdominal aortic ectasia 03/23/2021     Priority: Medium     3.1 cm 2021, 2.9 cm 2024, recommended repeat US in 10 years.      History of vertebral fracture 12/31/2020     Priority: Medium     Likely due to trauma (retired )      MGUS (monoclonal gammopathy of unknown significance) 12/31/2020     Priority: Medium     surveillance every 12 months with CBC, BMP and SPEP and provider visit.      Neuropathy 12/31/2020     Priority: Medium     Saw neurology. No underlying cause, except for possible association with MGUS. Cymbalta is helping.  Side effects from gabapentin and pregabalin. Could add amitriptyline if needed.      Palpitations 08/15/2019     Priority: Medium    Benign essential hypertension 08/15/2019     Priority: Medium    BPH (benign prostatic hyperplasia) 03/22/2019     Priority: Medium    Spinal stenosis of lumbar region with radiculopathy 07/24/2017     Priority: Medium    S/P lumbar spinal fusion 06/01/2017     Priority: Medium    Chronic back pain 12/29/2016     Priority: Medium    Fatty liver 11/06/2012     Priority: Medium    HLD (hyperlipidemia) 11/06/2012     Priority: Medium    HTN (hypertension) 11/06/2012     Priority: Medium    CHELA (obstructive sleep apnea) 10/11/2010     Priority: Medium     Mild CHELA (obstructive sleep apnea)          Past Medical History:    Past Medical History:   Diagnosis Date    Hyperlipidemia     Hypertension     MGUS (monoclonal gammopathy of unknown significance)        Past Surgical History:    No past surgical history on file.    Family History:    Family History   Problem Relation Age of Onset    Cerebrovascular Disease Mother     Cancer Mother     Cancer Brother        Social History:  Marital Status:   [2]  Social History     Tobacco Use    Smoking status: Never    Smokeless tobacco: Never   Substance Use Topics    Alcohol use: Never    Drug use: Never        Medications:    cetirizine (ZYRTEC) 10 MG tablet  fluticasone propionate (FLONASE) 50 mcg/actuation nasal spray  ibuprofen (ADVIL,MOTRIN) 200 MG tablet  irbesartan (AVAPRO) 300 MG tablet  levofloxacin (LEVAQUIN) 500 MG tablet  oxyCODONE (ROXICODONE) 5 MG tablet  terazosin (HYTRIN) 1 MG capsule          Review of Systems  Pertinent positives and negatives mentioned in HPI    Physical Exam        GEN: Awake, alert, and cooperative.  Nontoxic appearance  CV : Extremities warm and well perfused.  PULM: Normal effort. Speaking in full sentences.  ABD: Soft, nondistended.  Obese.  Nontender.  : Right testicle enlarged and tender.   Approximately 3-4 times the size of left testicle.  Does have vertical lie.  Scrotum is erythematous.  No crepitus.  No erythema of perineum.   INT: Warm. No diaphoresis. Normal color.     ED Course          Procedures                 Critical Care time:  none     None         Results for orders placed or performed during the hospital encounter of 04/08/25 (from the past 24 hours)   Basic metabolic panel   Result Value Ref Range    Sodium 137 135 - 145 mmol/L    Potassium 4.0 3.4 - 5.3 mmol/L    Chloride 104 98 - 107 mmol/L    Carbon Dioxide (CO2) 25 22 - 29 mmol/L    Anion Gap 8 7 - 15 mmol/L    Urea Nitrogen 15.2 8.0 - 23.0 mg/dL    Creatinine 0.90 0.67 - 1.17 mg/dL    GFR Estimate >90 >60 mL/min/1.73m2    Calcium 8.9 8.8 - 10.4 mg/dL    Glucose 103 (H) 70 - 99 mg/dL   CBC with platelets differential    Narrative    The following orders were created for panel order CBC with platelets differential.  Procedure                               Abnormality         Status                     ---------                               -----------         ------                     CBC with platelets and ...[[id number]]  Abnormal            Final result                 Please view results for these tests on the individual orders.   CBC with platelets and differential   Result Value Ref Range    WBC Count 12.4 (H) 4.0 - 11.0 10e3/uL    RBC Count 4.09 (L) 4.40 - 5.90 10e6/uL    Hemoglobin 12.7 (L) 13.3 - 17.7 g/dL    Hematocrit 37.1 (L) 40.0 - 53.0 %    MCV 91 78 - 100 fL    MCH 31.1 26.5 - 33.0 pg    MCHC 34.2 31.5 - 36.5 g/dL    RDW 12.6 10.0 - 15.0 %    Platelet Count 282 150 - 450 10e3/uL    % Neutrophils 76 %    % Lymphocytes 11 %    % Monocytes 10 %    % Eosinophils 2 %    % Basophils 1 %    % Immature Granulocytes 0 %    NRBCs per 100 WBC 0 <1 /100    Absolute Neutrophils 9.4 (H) 1.6 - 8.3 10e3/uL    Absolute Lymphocytes 1.4 0.8 - 5.3 10e3/uL    Absolute Monocytes 1.2 0.0 - 1.3 10e3/uL    Absolute Eosinophils 0.2 0.0 -  0.7 10e3/uL    Absolute Basophils 0.1 0.0 - 0.2 10e3/uL    Absolute Immature Granulocytes 0.1 <=0.4 10e3/uL    Absolute NRBCs 0.0 10e3/uL   US Testicular & Scrotum w Doppler Ltd    Narrative    EXAM: US TESTICULAR AND SCROTUM WITH DOPPLER LIMITED  LOCATION: [facility]  DATE: 4/8/2025    INDICATION: Swelling right testicle; pain.  COMPARISON: None.  TECHNIQUE: Ultrasound of scrotum with color flow and spectral Doppler with waveform analysis performed.    FINDINGS:    RIGHT: Right testicle measures 4.9 x 3.1 x 3.2 cm. Normal testicle with no masses. Normal arterial duplex and normal color flow. Suggestion of hyperemia of the epididymis. Small hydrocele with a few septations. No varicocele.    LEFT: Left testicle measures 4.3 x 2.8 x 2.8 cm. Normal testicle with no masses. Normal arterial duplex and normal color flow. Normal epididymis. No hydrocele. No varicocele. Incidental scrotal calcification.      Impression    IMPRESSION:  1.  Hyperemia of the right epididymis suggesting epididymitis.  2.  Small hydrocele on the right with a few internal septations.   UA with Microscopic reflex to Culture    Specimen: Urine, Clean Catch   Result Value Ref Range    Color Urine Yellow Colorless, Straw, Light Yellow, Yellow    Appearance Urine Clear Clear    Glucose Urine Negative Negative mg/dL    Bilirubin Urine Negative Negative    Ketones Urine Negative Negative mg/dL    Specific Gravity Urine 1.024 1.003 - 1.035    Blood Urine Negative Negative    pH Urine 6.5 5.0 - 7.0    Protein Albumin Urine Negative Negative mg/dL    Urobilinogen Urine Normal Normal mg/dL    Nitrite Urine Negative Negative    Leukocyte Esterase Urine Small (A) Negative    Bacteria Urine Few (A) None Seen /HPF    Mucus Urine Present (A) None Seen /LPF    RBC Urine 4 (H) <=2 /HPF    WBC Urine 26 (H) <=5 /HPF    Narrative    Urine Culture ordered based on laboratory criteria       Medications   oxyCODONE (ROXICODONE) tablet 5 mg (5  mg Oral $Given 4/8/25 0810)   ibuprofen (ADVIL/MOTRIN) tablet 600 mg (600 mg Oral $Given 4/8/25 0810)       Assessments & Plan (with Medical Decision Making)   70 year old male with 2 days of atraumatic right testicular pain and swelling detailed in HPI.  Right testicle is in large and very tender.  Does have vertical lie.  No hernias palpated.  Reassuring abdominal exam.  Concern for epididymitis/orchitis.  Low suspicion for Amilcar's.    CBC with mild leukocytosis of 12.4 with left shift.  Hemoglobin 12.7. Has mild anemia previously.  Hemoglobin was 13.7 most recently 5 months ago.  Normal lites and creatinine.  Urine appears infected with 26 WBCs, small leuk esterase and nitrite negative.  Ultrasound consistent with epididymitis.  Will treat with 14-day course of levofloxacin.  Urology follow-up referral placed.  Prescriptions sent to preferred pharmacy.  Patient had been taking OTC pain control which is not controlling his pain.  Was given 5 mg oxycodone here in department which made his pain more tolerable.  Is requesting oxycodone for home.  12 tablets sent with antibiotic. May need extended course of Abx based on response. Stressed importance of follow up and this possibility.            I have reviewed the nursing notes.         New Prescriptions    LEVOFLOXACIN (LEVAQUIN) 500 MG TABLET    Take 1 tablet (500 mg) by mouth daily for 14 days.    OXYCODONE (ROXICODONE) 5 MG TABLET    Take 1 tablet (5 mg) by mouth every 6 hours as needed for pain.       Final diagnoses:   Testicular pain, right   Epididymitis   Acute cystitis without hematuria     Joshua Rivear MD        4/8/2025   [facility] EMERGENCY DEPT    Disclaimer: This note consists of words and symbols derived from keyboarding and dictation using voice recognition software.  As a result, there may be errors that have gone undetected.  Please consider this when interpreting information found in this note.               Miguel  Joshua FRANKLIN MD  04/10/25 2012

## 2025-04-08 NOTE — DISCHARGE INSTRUCTIONS
I would like you to take levofloxacin daily for the next 14 days.    A referral for urology was placed and you should receive a call to set up a follow-up appointment.    Ibuprofen and Tylenol for primary pain control.  May take oxycodone for severe pain.    Return to emergency department for new or worsening symptoms otherwise follow-up with your primary care team as well as urology.

## 2025-04-09 LAB — BACTERIA UR CULT: ABNORMAL

## 2025-04-10 ENCOUNTER — TELEPHONE (OUTPATIENT)
Dept: NURSING | Facility: CLINIC | Age: 70
End: 2025-04-10
Payer: COMMERCIAL

## 2025-04-10 NOTE — TELEPHONE ENCOUNTER
[facility]    Reason for call: Lab Result Notification     Lab Result (including Rx patient on, if applicable).  If culture, copy of lab report at bottom.  Lab Result: Urine Culture - see below    ED Rx: levofloxacin (LEVAQUIN) 500 MG tablet - Take 1 tablet (500 mg) by mouth daily for 14 days   50,000-100,000 CFU/mL Escherichia coli - Susceptible    Creatinine Level (mg/dl)   Creatinine   Date Value Ref Range Status   04/08/2025 0.90 0.67 - 1.17 mg/dL Final    Creatinine clearance (ml/min), if applicable    Serum creatinine: 0.9 mg/dL 04/08/25 0815  Estimated creatinine clearance: 105.8 mL/min     ED Symptoms: Patient presented to [facility] ED on 4/8/2025 with pain, swelling and redness in right testicle as well as some urinary urgency.    RN Recommendations/Instructions per [place] ED lab result protocol:   [facility] ED lab result protocol utilized: Urine Culture  Continue antibiotic/medication as prescribed: Levaquin, pending patient assessment      Unable to reach patient/caregiver.     Left voicemail message requesting a call back to [phone] between 9 a.m. and 5:30 p.m. for patient's ED/UC lab results.      Letter pended to be sent via [employer].       Nanda Meyer RN

## 2025-04-10 NOTE — LETTER
April 10, 2025        Alfonso Conn  [address]  [place] [address]          Dear Alfonso Conn:    You were seen in the [facility] Emergency Department at [facility] on 4/8/2025.  We are unable to reach you by phone, so we are sending you this letter.     It is important that you call [facility] Emergency Department lab result nurse at [phone], as we have information to relay to you AND/OR we MAY have to make some changes in your treatment.    Best time to call back is between 9AM and 5:30PM, 7 days a week.      Sincerely,     [facility] Emergency Department Lab Result RN  [phone]

## 2025-04-10 NOTE — TELEPHONE ENCOUNTER
"Patient calling back. States that he has been on the Levaquin since Tuesday and states he has not had any improvement. He states that his pain is worse and and his testicle has become even more swollen. He states that pain can \"drop me to my knees\".  Advised patient be re-evaluated in the ED due to worsening symptoms despite antibiotics. Patient verbalized understanding. He states he cannot get in to see urology until late next week.     MICHELLE ROWELL RN    "

## 2025-04-11 ENCOUNTER — HOSPITAL ENCOUNTER (INPATIENT)
Facility: CLINIC | Age: 70
LOS: 2 days | Discharge: HOME | DRG: 728 | End: 2025-04-13
Attending: EMERGENCY MEDICINE | Admitting: STUDENT IN AN ORGANIZED HEALTH CARE EDUCATION/TRAINING PROGRAM
Payer: COMMERCIAL

## 2025-04-11 ENCOUNTER — APPOINTMENT (OUTPATIENT)
Dept: CT IMAGING | Facility: CLINIC | Age: 70
DRG: 728 | End: 2025-04-11
Attending: EMERGENCY MEDICINE
Payer: COMMERCIAL

## 2025-04-11 DIAGNOSIS — N49.2 CELLULITIS OF SCROTUM: ICD-10-CM

## 2025-04-11 DIAGNOSIS — R93.89 ABNORMAL CT SCAN: ICD-10-CM

## 2025-04-11 PROBLEM — N39.0 E-COLI UTI: Status: ACTIVE | Noted: 2025-04-11

## 2025-04-11 PROBLEM — M48.061 SPINAL STENOSIS OF LUMBAR REGION WITH RADICULOPATHY: Status: ACTIVE | Noted: 2017-07-24

## 2025-04-11 PROBLEM — I10 BENIGN ESSENTIAL HYPERTENSION: Status: ACTIVE | Noted: 2019-08-15

## 2025-04-11 PROBLEM — D47.2 MGUS (MONOCLONAL GAMMOPATHY OF UNKNOWN SIGNIFICANCE): Status: ACTIVE | Noted: 2020-12-31

## 2025-04-11 PROBLEM — Z98.1 S/P LUMBAR SPINAL FUSION: Status: ACTIVE | Noted: 2017-06-01

## 2025-04-11 PROBLEM — B96.20 E-COLI UTI: Status: ACTIVE | Noted: 2025-04-11

## 2025-04-11 PROBLEM — M54.16 SPINAL STENOSIS OF LUMBAR REGION WITH RADICULOPATHY: Status: ACTIVE | Noted: 2017-07-24

## 2025-04-11 LAB
ALBUMIN SERPL BCG-MCNC: 3.6 G/DL (ref 3.5–5.2)
ALP SERPL-CCNC: 50 U/L (ref 40–150)
ALT SERPL W P-5'-P-CCNC: 19 U/L (ref 0–70)
ANION GAP SERPL CALCULATED.3IONS-SCNC: 11 MMOL/L (ref 7–15)
AST SERPL W P-5'-P-CCNC: 24 U/L (ref 0–45)
BASOPHILS # BLD AUTO: 0.1 10E3/UL (ref 0–0.2)
BASOPHILS NFR BLD AUTO: 1 %
BILIRUB SERPL-MCNC: 0.4 MG/DL
BUN SERPL-MCNC: 12.7 MG/DL (ref 8–23)
CALCIUM SERPL-MCNC: 8.8 MG/DL (ref 8.8–10.4)
CHLORIDE SERPL-SCNC: 103 MMOL/L (ref 98–107)
CREAT SERPL-MCNC: 0.83 MG/DL (ref 0.67–1.17)
CRP SERPL-MCNC: 87.27 MG/L
EGFRCR SERPLBLD CKD-EPI 2021: >90 ML/MIN/1.73M2
EOSINOPHIL # BLD AUTO: 0.5 10E3/UL (ref 0–0.7)
EOSINOPHIL NFR BLD AUTO: 6 %
ERYTHROCYTE [DISTWIDTH] IN BLOOD BY AUTOMATED COUNT: 12.8 % (ref 10–15)
GLUCOSE SERPL-MCNC: 87 MG/DL (ref 70–99)
HCO3 SERPL-SCNC: 24 MMOL/L (ref 22–29)
HCT VFR BLD AUTO: 33.4 % (ref 40–53)
HGB BLD-MCNC: 11.4 G/DL (ref 13.3–17.7)
IMM GRANULOCYTES # BLD: 0.1 10E3/UL
IMM GRANULOCYTES NFR BLD: 1 %
LYMPHOCYTES # BLD AUTO: 1.5 10E3/UL (ref 0.8–5.3)
LYMPHOCYTES NFR BLD AUTO: 19 %
MCH RBC QN AUTO: 31.3 PG (ref 26.5–33)
MCHC RBC AUTO-ENTMCNC: 34.1 G/DL (ref 31.5–36.5)
MCV RBC AUTO: 92 FL (ref 78–100)
MONOCYTES # BLD AUTO: 0.6 10E3/UL (ref 0–1.3)
MONOCYTES NFR BLD AUTO: 8 %
MRSA DNA SPEC QL NAA+PROBE: NEGATIVE
NEUTROPHILS # BLD AUTO: 5 10E3/UL (ref 1.6–8.3)
NEUTROPHILS NFR BLD AUTO: 65 %
NRBC # BLD AUTO: 0 10E3/UL
NRBC BLD AUTO-RTO: 0 /100
PLATELET # BLD AUTO: 312 10E3/UL (ref 150–450)
POTASSIUM SERPL-SCNC: 4.2 MMOL/L (ref 3.4–5.3)
PROCALCITONIN SERPL IA-MCNC: 0.03 NG/ML
PROT SERPL-MCNC: 7.2 G/DL (ref 6.4–8.3)
RBC # BLD AUTO: 3.64 10E6/UL (ref 4.4–5.9)
SA TARGET DNA: NEGATIVE
SODIUM SERPL-SCNC: 138 MMOL/L (ref 135–145)
WBC # BLD AUTO: 7.7 10E3/UL (ref 4–11)

## 2025-04-11 PROCEDURE — 999N000157 HC STATISTIC RCP TIME EA 10 MIN

## 2025-04-11 PROCEDURE — 120N000001 HC R&B MED SURG/OB

## 2025-04-11 PROCEDURE — 85025 COMPLETE CBC W/AUTO DIFF WBC: CPT | Performed by: EMERGENCY MEDICINE

## 2025-04-11 PROCEDURE — 250N000011 HC RX IP 250 OP 636: Performed by: EMERGENCY MEDICINE

## 2025-04-11 PROCEDURE — 250N000011 HC RX IP 250 OP 636: Mod: JZ | Performed by: PHYSICIAN ASSISTANT

## 2025-04-11 PROCEDURE — 250N000009 HC RX 250: Performed by: EMERGENCY MEDICINE

## 2025-04-11 PROCEDURE — 80053 COMPREHEN METABOLIC PANEL: CPT | Performed by: EMERGENCY MEDICINE

## 2025-04-11 PROCEDURE — 99223 1ST HOSP IP/OBS HIGH 75: CPT

## 2025-04-11 PROCEDURE — 99291 CRITICAL CARE FIRST HOUR: CPT | Mod: 25

## 2025-04-11 PROCEDURE — 36415 COLL VENOUS BLD VENIPUNCTURE: CPT | Performed by: EMERGENCY MEDICINE

## 2025-04-11 PROCEDURE — 250N000013 HC RX MED GY IP 250 OP 250 PS 637: Performed by: PHYSICIAN ASSISTANT

## 2025-04-11 PROCEDURE — 72193 CT PELVIS W/DYE: CPT

## 2025-04-11 PROCEDURE — 84145 PROCALCITONIN (PCT): CPT | Performed by: EMERGENCY MEDICINE

## 2025-04-11 PROCEDURE — 96366 THER/PROPH/DIAG IV INF ADDON: CPT

## 2025-04-11 PROCEDURE — 96375 TX/PRO/DX INJ NEW DRUG ADDON: CPT

## 2025-04-11 PROCEDURE — 86140 C-REACTIVE PROTEIN: CPT | Performed by: EMERGENCY MEDICINE

## 2025-04-11 PROCEDURE — 258N000003 HC RX IP 258 OP 636: Performed by: EMERGENCY MEDICINE

## 2025-04-11 PROCEDURE — 96365 THER/PROPH/DIAG IV INF INIT: CPT | Mod: 59

## 2025-04-11 PROCEDURE — 87641 MR-STAPH DNA AMP PROBE: CPT | Performed by: EMERGENCY MEDICINE

## 2025-04-11 PROCEDURE — 99291 CRITICAL CARE FIRST HOUR: CPT | Mod: 25 | Performed by: EMERGENCY MEDICINE

## 2025-04-11 RX ORDER — HYDROMORPHONE HYDROCHLORIDE 1 MG/ML
0.5 INJECTION, SOLUTION INTRAMUSCULAR; INTRAVENOUS; SUBCUTANEOUS
Status: DISCONTINUED | OUTPATIENT
Start: 2025-04-11 | End: 2025-04-11

## 2025-04-11 RX ORDER — TERAZOSIN 2 MG/1
2 CAPSULE ORAL AT BEDTIME
Status: DISCONTINUED | OUTPATIENT
Start: 2025-04-11 | End: 2025-04-13 | Stop reason: HOSPADM

## 2025-04-11 RX ORDER — HYDROCORTISONE 25 MG/G
CREAM TOPICAL
COMMUNITY

## 2025-04-11 RX ORDER — ACETAMINOPHEN 325 MG/1
3 TABLET ORAL EVERY 6 HOURS PRN
COMMUNITY

## 2025-04-11 RX ORDER — NALOXONE HYDROCHLORIDE 0.4 MG/ML
0.4 INJECTION, SOLUTION INTRAMUSCULAR; INTRAVENOUS; SUBCUTANEOUS
Status: DISCONTINUED | OUTPATIENT
Start: 2025-04-11 | End: 2025-04-13 | Stop reason: HOSPADM

## 2025-04-11 RX ORDER — ONDANSETRON 2 MG/ML
4 INJECTION INTRAMUSCULAR; INTRAVENOUS EVERY 6 HOURS PRN
Status: DISCONTINUED | OUTPATIENT
Start: 2025-04-11 | End: 2025-04-11

## 2025-04-11 RX ORDER — CALCIUM CARBONATE 500 MG/1
1000 TABLET, CHEWABLE ORAL 4 TIMES DAILY PRN
Status: DISCONTINUED | OUTPATIENT
Start: 2025-04-11 | End: 2025-04-13 | Stop reason: HOSPADM

## 2025-04-11 RX ORDER — ACETAMINOPHEN 325 MG/1
650 TABLET ORAL EVERY 4 HOURS PRN
Status: DISCONTINUED | OUTPATIENT
Start: 2025-04-11 | End: 2025-04-11

## 2025-04-11 RX ORDER — ONDANSETRON 4 MG/1
4 TABLET, ORALLY DISINTEGRATING ORAL EVERY 6 HOURS PRN
Status: DISCONTINUED | OUTPATIENT
Start: 2025-04-11 | End: 2025-04-13 | Stop reason: HOSPADM

## 2025-04-11 RX ORDER — PROCHLORPERAZINE MALEATE 5 MG/1
5 TABLET ORAL EVERY 6 HOURS PRN
Status: DISCONTINUED | OUTPATIENT
Start: 2025-04-11 | End: 2025-04-13 | Stop reason: HOSPADM

## 2025-04-11 RX ORDER — NIFEDIPINE 30 MG/1
1 TABLET, EXTENDED RELEASE ORAL DAILY
COMMUNITY
Start: 2024-12-27 | End: 2025-12-27

## 2025-04-11 RX ORDER — NALOXONE HYDROCHLORIDE 0.4 MG/ML
0.2 INJECTION, SOLUTION INTRAMUSCULAR; INTRAVENOUS; SUBCUTANEOUS
Status: DISCONTINUED | OUTPATIENT
Start: 2025-04-11 | End: 2025-04-13 | Stop reason: HOSPADM

## 2025-04-11 RX ORDER — HYDROMORPHONE HCL IN WATER/PF 6 MG/30 ML
0.2 PATIENT CONTROLLED ANALGESIA SYRINGE INTRAVENOUS
Status: COMPLETED | OUTPATIENT
Start: 2025-04-11 | End: 2025-04-11

## 2025-04-11 RX ORDER — ONDANSETRON 4 MG/1
4 TABLET, ORALLY DISINTEGRATING ORAL EVERY 6 HOURS PRN
Status: DISCONTINUED | OUTPATIENT
Start: 2025-04-11 | End: 2025-04-11

## 2025-04-11 RX ORDER — LIDOCAINE 40 MG/G
CREAM TOPICAL
Status: DISCONTINUED | OUTPATIENT
Start: 2025-04-11 | End: 2025-04-13 | Stop reason: HOSPADM

## 2025-04-11 RX ORDER — TERAZOSIN 2 MG/1
1 CAPSULE ORAL AT BEDTIME
COMMUNITY
Start: 2025-03-21

## 2025-04-11 RX ORDER — SODIUM CHLORIDE 9 MG/ML
1000 INJECTION, SOLUTION INTRAVENOUS CONTINUOUS
Status: DISCONTINUED | OUTPATIENT
Start: 2025-04-11 | End: 2025-04-12

## 2025-04-11 RX ORDER — ONDANSETRON 2 MG/ML
4 INJECTION INTRAMUSCULAR; INTRAVENOUS EVERY 6 HOURS PRN
Status: DISCONTINUED | OUTPATIENT
Start: 2025-04-11 | End: 2025-04-13 | Stop reason: HOSPADM

## 2025-04-11 RX ORDER — AMOXICILLIN 250 MG
1 CAPSULE ORAL 2 TIMES DAILY PRN
Status: DISCONTINUED | OUTPATIENT
Start: 2025-04-11 | End: 2025-04-13 | Stop reason: HOSPADM

## 2025-04-11 RX ORDER — ACETAMINOPHEN 325 MG/1
650 TABLET ORAL EVERY 4 HOURS PRN
Status: DISCONTINUED | OUTPATIENT
Start: 2025-04-11 | End: 2025-04-13 | Stop reason: HOSPADM

## 2025-04-11 RX ORDER — AMOXICILLIN 250 MG
2 CAPSULE ORAL 2 TIMES DAILY PRN
Status: DISCONTINUED | OUTPATIENT
Start: 2025-04-11 | End: 2025-04-13 | Stop reason: HOSPADM

## 2025-04-11 RX ORDER — CEFTRIAXONE 2 G/1
2 INJECTION, POWDER, FOR SOLUTION INTRAMUSCULAR; INTRAVENOUS EVERY 24 HOURS
Status: DISCONTINUED | OUTPATIENT
Start: 2025-04-11 | End: 2025-04-13 | Stop reason: HOSPADM

## 2025-04-11 RX ORDER — IBUPROFEN 400 MG/1
400 TABLET, FILM COATED ORAL EVERY 8 HOURS PRN
Status: DISCONTINUED | OUTPATIENT
Start: 2025-04-11 | End: 2025-04-13 | Stop reason: HOSPADM

## 2025-04-11 RX ORDER — ONDANSETRON 2 MG/ML
4 INJECTION INTRAMUSCULAR; INTRAVENOUS
Status: COMPLETED | OUTPATIENT
Start: 2025-04-11 | End: 2025-04-11

## 2025-04-11 RX ORDER — IRBESARTAN 75 MG/1
300 TABLET ORAL AT BEDTIME
Status: DISCONTINUED | OUTPATIENT
Start: 2025-04-11 | End: 2025-04-13 | Stop reason: HOSPADM

## 2025-04-11 RX ORDER — EZETIMIBE 10 MG/1
10 TABLET ORAL AT BEDTIME
Status: DISCONTINUED | OUTPATIENT
Start: 2025-04-11 | End: 2025-04-13 | Stop reason: HOSPADM

## 2025-04-11 RX ORDER — EZETIMIBE 10 MG/1
1 TABLET ORAL DAILY
COMMUNITY
Start: 2023-01-10

## 2025-04-11 RX ORDER — SODIUM CHLORIDE, SODIUM LACTATE, POTASSIUM CHLORIDE, CALCIUM CHLORIDE 600; 310; 30; 20 MG/100ML; MG/100ML; MG/100ML; MG/100ML
1000 INJECTION, SOLUTION INTRAVENOUS CONTINUOUS
Status: DISCONTINUED | OUTPATIENT
Start: 2025-04-11 | End: 2025-04-11

## 2025-04-11 RX ORDER — CALCIUM CARBONATE 500 MG/1
1000 TABLET, CHEWABLE ORAL 4 TIMES DAILY PRN
Status: DISCONTINUED | OUTPATIENT
Start: 2025-04-11 | End: 2025-04-11

## 2025-04-11 RX ORDER — IOPAMIDOL 755 MG/ML
80 INJECTION, SOLUTION INTRAVASCULAR ONCE
Status: COMPLETED | OUTPATIENT
Start: 2025-04-11 | End: 2025-04-11

## 2025-04-11 RX ORDER — NIFEDIPINE 30 MG/1
30 TABLET, EXTENDED RELEASE ORAL AT BEDTIME
Status: DISCONTINUED | OUTPATIENT
Start: 2025-04-11 | End: 2025-04-13 | Stop reason: HOSPADM

## 2025-04-11 RX ADMIN — HYDROMORPHONE HYDROCHLORIDE 0.5 MG: 1 INJECTION, SOLUTION INTRAMUSCULAR; INTRAVENOUS; SUBCUTANEOUS at 13:01

## 2025-04-11 RX ADMIN — IRBESARTAN 300 MG: 75 TABLET ORAL at 22:18

## 2025-04-11 RX ADMIN — Medication 1500 MG: at 15:46

## 2025-04-11 RX ADMIN — SODIUM CHLORIDE 1000 ML: 0.9 INJECTION, SOLUTION INTRAVENOUS at 15:50

## 2025-04-11 RX ADMIN — TERAZOSIN HYDROCHLORIDE 2 MG: 2 CAPSULE ORAL at 22:18

## 2025-04-11 RX ADMIN — EZETIMIBE 10 MG: 10 TABLET ORAL at 22:17

## 2025-04-11 RX ADMIN — HYDROMORPHONE HYDROCHLORIDE 0.2 MG: 0.2 INJECTION, SOLUTION INTRAMUSCULAR; INTRAVENOUS; SUBCUTANEOUS at 22:19

## 2025-04-11 RX ADMIN — IOPAMIDOL 80 ML: 755 INJECTION, SOLUTION INTRAVENOUS at 13:25

## 2025-04-11 RX ADMIN — HYDROMORPHONE HYDROCHLORIDE 0.5 MG: 1 INJECTION, SOLUTION INTRAMUSCULAR; INTRAVENOUS; SUBCUTANEOUS at 16:20

## 2025-04-11 RX ADMIN — NIFEDIPINE 30 MG: 30 TABLET, FILM COATED, EXTENDED RELEASE ORAL at 22:19

## 2025-04-11 RX ADMIN — CEFTRIAXONE SODIUM 2 G: 2 INJECTION, POWDER, FOR SOLUTION INTRAMUSCULAR; INTRAVENOUS at 13:02

## 2025-04-11 RX ADMIN — ONDANSETRON 4 MG: 2 INJECTION, SOLUTION INTRAMUSCULAR; INTRAVENOUS at 13:01

## 2025-04-11 RX ADMIN — SODIUM CHLORIDE 1000 ML: 0.9 INJECTION, SOLUTION INTRAVENOUS at 17:48

## 2025-04-11 RX ADMIN — SODIUM CHLORIDE 500 ML: 9 INJECTION, SOLUTION INTRAVENOUS at 12:58

## 2025-04-11 RX ADMIN — HYDROMORPHONE HYDROCHLORIDE 0.5 MG: 1 INJECTION, SOLUTION INTRAMUSCULAR; INTRAVENOUS; SUBCUTANEOUS at 19:55

## 2025-04-11 RX ADMIN — SODIUM CHLORIDE 100 ML: 9 INJECTION, SOLUTION INTRAVENOUS at 13:25

## 2025-04-11 ASSESSMENT — ACTIVITIES OF DAILY LIVING (ADL)
ADLS_ACUITY_SCORE: 45
ADLS_ACUITY_SCORE: 45
ADLS_ACUITY_SCORE: 22
ADLS_ACUITY_SCORE: 45
ADLS_ACUITY_SCORE: 22
ADLS_ACUITY_SCORE: 45
ADLS_ACUITY_SCORE: 22
ADLS_ACUITY_SCORE: 22
ADLS_ACUITY_SCORE: 45
ADLS_ACUITY_SCORE: 22

## 2025-04-11 ASSESSMENT — ENCOUNTER SYMPTOMS
GASTROINTESTINAL NEGATIVE: 1
MUSCULOSKELETAL NEGATIVE: 1
HEMATOLOGIC/LYMPHATIC NEGATIVE: 1
NEUROLOGICAL NEGATIVE: 1
RESPIRATORY NEGATIVE: 1
EYES NEGATIVE: 1
PSYCHIATRIC NEGATIVE: 1
CHILLS: 1
CARDIOVASCULAR NEGATIVE: 1
ALLERGIC/IMMUNOLOGIC NEGATIVE: 1
ENDOCRINE NEGATIVE: 1

## 2025-04-11 ASSESSMENT — COLUMBIA-SUICIDE SEVERITY RATING SCALE - C-SSRS
1. IN THE PAST MONTH, HAVE YOU WISHED YOU WERE DEAD OR WISHED YOU COULD GO TO SLEEP AND NOT WAKE UP?: NO
6. HAVE YOU EVER DONE ANYTHING, STARTED TO DO ANYTHING, OR PREPARED TO DO ANYTHING TO END YOUR LIFE?: NO
2. HAVE YOU ACTUALLY HAD ANY THOUGHTS OF KILLING YOURSELF IN THE PAST MONTH?: NO

## 2025-04-11 NOTE — PROGRESS NOTES
[facility] ADMISSION NOTE    Patient admitted to room 2404 at approximately 1630 via cart from emergency room. Patient was accompanied by transport tech.     SBAR report received from Er Handoff Note prior to patient arrival.     Patient ambulated to bed independently. Patient alert and oriented X 3. Pain is controlled with current analgesics.  Medication(s) being used: narcotic analgesics including hydromorphone.  . Admission vital signs: Blood pressure (!) 157/79, pulse 74, temperature 97.2  F (36.2  C), temperature source Oral, resp. rate 18, weight 129.4 kg (285 lb 4.4 oz), SpO2 98%. Patient was oriented to plan of care, call light, bed controls, tv, telephone, bathroom, and visiting hours.     Risk Assessment    The following safety risks were identified during admission: none. Yellow risk band applied: NO.     Skin Initial Assessment    This writer admitted this patient and completed a full skin assessment and Girish score in the Adult PCS flowsheet.   Photo documentation of skin problem and/or wound competed via [employer] application (located under Media):  N/A    Appropriate interventions initiated as needed.     Secondary skin check completed by RN was only person in room for skin check. Scrotum is red and swollen, no other skin issues.         Education    Patient has a [place] to Observation order: No  Observation education completed and documented: N/A      Ede Bernabe RN

## 2025-04-11 NOTE — ED TRIAGE NOTES
Pt was seen in ER on Tuesday, diagnosed with epidymidis and a UTI. Pt also had right groin swelling as well. Since then, patient has been taking antibiotics but right testicle swelling has increased. Feels pain in the lower back. Last took ibuprofen at 0600 today.      Triage Assessment (Adult)       Row Name 04/11/25 1046          Triage Assessment    Airway WDL WDL        Respiratory WDL    Respiratory WDL WDL        Skin Circulation/Temperature WDL    Skin Circulation/Temperature WDL WDL        Cardiac WDL    Cardiac WDL WDL        Peripheral/Neurovascular WDL    Peripheral Neurovascular WDL WDL        Cognitive/Neuro/Behavioral WDL    Cognitive/Neuro/Behavioral WDL WDL

## 2025-04-11 NOTE — PHARMACY-VANCOMYCIN DOSING SERVICE
Pharmacy Vancomycin Initial Note  Date of Service 2025  Patient's  1955  70 year old, male    Indications based on documentation: Abscess, Sepsis, and Skin and Soft Tissue Infection    Current estimated CrCl = Estimated Creatinine Clearance: 113.9 mL/min (based on SCr of 0.83 mg/dL).    Creatinine for last 3 days  2025: 12:26 PM Creatinine 0.83 mg/dL    Recent Vancomycin Level(s) for last 3 days  No results found for requested labs within last 3 days.      Vancomycin IV Administrations (past 72 hours)        No vancomycin orders with administrations in past 72 hours.                    Nephrotoxins and other renal medications (From now, onward)      Start     Dose/Rate Route Frequency Ordered Stop    25 1500  vancomycin (VANCOCIN) 1,500 mg in 0.9% NaCl 250 mL intermittent infusion         1,500 mg  over 90 Minutes Intravenous EVERY 12 HOURS 25 1448              Contrast Orders - past 72 hours (72h ago, onward)      Start     Dose/Rate Route Frequency Stop    25 1225  iopamidol (ISOVUE-370) solution 80 mL         80 mL Intravenous ONCE 25 1325            TecturaRQBE Prediction of Planned Initial Vancomycin Regimen    Loading dose: N/A  Regimen: 1500 mg IV every 12 hours.  Start time: 15:00 on 2025  Exposure target: AUC24 (range) 400-600 mg/L.hr   AUC24,ss: 554 mg/L.hr  Probability of AUC24 > 400: 83 %  Ctrough,ss: 18.1 mg/L  Probability of Ctrough,ss > 20: 41 %  Probability of nephrotoxicity (Lodise BRIANA ): 14 %          Plan:  Start vancomycin as stated above.  Vancomycin monitoring method: AUC  Vancomycin therapeutic monitoring goal: 400-600 mg*h/L  Pharmacy will check vancomycin levels as appropriate in 1-3 Days.    Serum creatinine levels will be ordered daily for the first week of therapy and at least twice weekly for subsequent weeks.      Yuri Lockett [facility]

## 2025-04-11 NOTE — PROGRESS NOTES
Reviewing chart due to high probability of admit to Med/Surg unit.     Hung Rose RN on 4/11/2025 at 3:33 PM

## 2025-04-11 NOTE — MEDICATION SCRIBE - ADMISSION MEDICATION HISTORY
Medication Scribe Admission Medication History    Admission medication history is complete. The information provided in this note is only as accurate as the sources available at the time of the update.    Information Source(s): Patient via in-person    Pertinent Information:     Changes made to PTA medication list:  Added: terazosin 2 mg, repatha, zetia, nifedipine, tylenol, hydrocort cr   Deleted: terazosin 1 mg, zyrtec, fluticasone ns   Changed: None    Allergies reviewed with patient and updates made in EHR: yes    Medication History Completed By: Precious Parra 4/11/2025 6:24 PM    PTA Med List   Medication Sig Note Last Dose/Taking    acetaminophen (TYLENOL) 325 MG tablet Take 3 tablets by mouth every 6 hours as needed for mild pain.  4/11/2025 at  6:00 AM    evolocumab (REPATHA) 140 MG/ML prefilled autoinjector Inject 140 mg subcutaneously every 14 days. 4/11/2025: Pt is due for injection today Morning    ezetimibe (ZETIA) 10 MG tablet Take 1 tablet by mouth daily.  4/10/2025 Bedtime    hydrocortisone 2.5 % cream APPLY TO RASH IN JESSIE UP TO TWICE DAILY AS NEEDED FOR ITCHING  Past Month    ibuprofen (ADVIL,MOTRIN) 200 MG tablet Take 3 tablets by mouth every 8 hours as needed.  4/11/2025 at  6:00 AM    irbesartan (AVAPRO) 300 MG tablet Take 1 tablet by mouth daily.  4/10/2025 Bedtime    levofloxacin (LEVAQUIN) 500 MG tablet Take 1 tablet (500 mg) by mouth daily for 14 days.  4/11/2025 Morning    NIFEdipine ER OSMOTIC (PROCARDIA XL) 30 MG 24 hr tablet Take 1 tablet by mouth daily.  4/10/2025 Bedtime    oxyCODONE (ROXICODONE) 5 MG tablet Take 1 tablet (5 mg) by mouth every 6 hours as needed for pain.  4/10/2025 Bedtime    terazosin (HYTRIN) 2 MG capsule Take 1 capsule by mouth at bedtime.  4/10/2025 Bedtime

## 2025-04-11 NOTE — ED PROVIDER NOTES
History     Chief Complaint   Patient presents with    Groin Swelling     HPI  Alfonso Conn is a 70 year old male who presents for evaluation with concern about groin discomfort.  Patient reported he was evaluated 3 days ago and diagnosed with a UTI and epididymitis patient reported concern about increasing right testicular swelling despite compliance with oral antibiotics provided after his visit 3 days earlier .  Reviewed visit on 4/8/25-discharged with oxycodone and levofloxacin for testicular pain with epididymitis and acute cystitis.  Ultrasound was completed during this visit 3 days earlier revealing hyperemia of the right epididymis suggesting epididymitis and small hydrocele right with few internal septations    On examination patient arrived by car with wife- Corinna.  Patient reports since his assessment 3 days ago he has had progressive testicular swelling and tenderness.  He reports has been compliant with rifaximin.  He has had low-grade fever with chills       Allergies:  Allergies   Allergen Reactions    Amlodipine Confusion, Other (See Comments) and Unknown    Atorvastatin Other (See Comments)    Chlorthalidone Other (See Comments)     Shortness of breath, difficulty thinking, full-body pain, fatigue    Gabapentin Other (See Comments)     Difficulty concentrating    Lisinopril Cough    Losartan Potassium-Hctz Other (See Comments)     Mouth tingling; leg weakness; slurred words    Metoprolol Other (See Comments)    Pravastatin Muscle Pain (Myalgia) and Other (See Comments)    Bupropion Hives and Rash     Took more than was prescribed    Wellbutrin       Problem List:    Patient Active Problem List    Diagnosis Date Noted    Abdominal aortic ectasia 03/23/2021     Priority: Medium     3.1 cm 2021, 2.9 cm 2024, recommended repeat US in 10 years.      History of vertebral fracture 12/31/2020     Priority: Medium     Likely due to trauma (retired )      MGUS (monoclonal gammopathy of  unknown significance) 12/31/2020     Priority: Medium     surveillance every 12 months with CBC, BMP and SPEP and provider visit.      Neuropathy 12/31/2020     Priority: Medium     Saw neurology. No underlying cause, except for possible association with MGUS. Cymbalta is helping. Side effects from gabapentin and pregabalin. Could add amitriptyline if needed.      Palpitations 08/15/2019     Priority: Medium    Benign essential hypertension 08/15/2019     Priority: Medium    BPH (benign prostatic hyperplasia) 03/22/2019     Priority: Medium    Spinal stenosis of lumbar region with radiculopathy 07/24/2017     Priority: Medium    S/P lumbar spinal fusion 06/01/2017     Priority: Medium    Chronic back pain 12/29/2016     Priority: Medium    Fatty liver 11/06/2012     Priority: Medium    HLD (hyperlipidemia) 11/06/2012     Priority: Medium    HTN (hypertension) 11/06/2012     Priority: Medium    CHELA (obstructive sleep apnea) 10/11/2010     Priority: Medium     Mild CHELA (obstructive sleep apnea)          Past Medical History:    Past Medical History:   Diagnosis Date    Hyperlipidemia     Hypertension     MGUS (monoclonal gammopathy of unknown significance)        Past Surgical History:    No past surgical history on file.    Family History:    Family History   Problem Relation Age of Onset    Cerebrovascular Disease Mother     Cancer Mother     Cancer Brother        Social History:  Marital Status:   [2]  Social History     Tobacco Use    Smoking status: Never    Smokeless tobacco: Never   Substance Use Topics    Alcohol use: Never    Drug use: Never        Medications:    cetirizine (ZYRTEC) 10 MG tablet  fluticasone propionate (FLONASE) 50 mcg/actuation nasal spray  ibuprofen (ADVIL,MOTRIN) 200 MG tablet  irbesartan (AVAPRO) 300 MG tablet  levofloxacin (LEVAQUIN) 500 MG tablet  oxyCODONE (ROXICODONE) 5 MG tablet  terazosin (HYTRIN) 1 MG capsule          Review of Systems   Constitutional:  Positive for  chills.   HENT: Negative.     Eyes: Negative.    Respiratory: Negative.     Cardiovascular: Negative.    Gastrointestinal: Negative.    Endocrine: Negative.    Genitourinary:  Positive for scrotal swelling and urgency.   Musculoskeletal: Negative.    Skin: Negative.    Allergic/Immunologic: Negative.    Neurological: Negative.    Hematological: Negative.    Psychiatric/Behavioral: Negative.     All other systems reviewed and are negative.      Physical Exam   BP: (!) 155/77  Pulse: 59  Temp: 98.1  F (36.7  C)  Resp: 16  Weight: 127 kg (280 lb)  SpO2: 96 %      Physical Exam  Exam conducted with a chaperone present.   Constitutional:       General: He is not in acute distress.     Appearance: He is not ill-appearing, toxic-appearing or diaphoretic.   HENT:      Head: Normocephalic and atraumatic.      Nose: Nose normal.   Eyes:      Extraocular Movements: Extraocular movements intact.      Pupils: Pupils are equal, round, and reactive to light.   Cardiovascular:      Rate and Rhythm: Normal rate and regular rhythm.      Pulses: Normal pulses.   Genitourinary:     Penis: Normal and circumcised.       Testes:         Right: Tenderness and swelling present.       Musculoskeletal:      Cervical back: Normal range of motion and neck supple.   Skin:     Capillary Refill: Capillary refill takes less than 2 seconds.      Coloration: Skin is not jaundiced or pale.      Findings: No bruising, erythema, lesion or rash.   Neurological:      General: No focal deficit present.      Mental Status: He is alert and oriented to person, place, and time.      Cranial Nerves: No cranial nerve deficit.      Sensory: No sensory deficit.      Motor: No weakness.      Coordination: Coordination normal.      Gait: Gait normal.      Deep Tendon Reflexes: Reflexes normal.   Psychiatric:         Mood and Affect: Mood normal.         Behavior: Behavior normal.         Thought Content: Thought content normal.         Judgment: Judgment normal.          ED Course        Procedures              Critical Care time:  was 60 minutes for this patient excluding procedures.     The patient has signs of sepsis   Sepsis ED evaluation   The patient has signs of sepsis as evidenced by:  1. Presence of 2 SIRS criteria, suspected infection, AND  2. Organ dysfunction: Sepsis work up in progress. Will continue to monitor for signs of organ dysfunction    Sepsis Care Initiation: Starting   on 04/11/25, until specified. Prior to this documentation, sepsis, severe sepsis, or septic shock was NOT thought to be a significant cause of illness. This order represents the first time infection was seriously considered to be affecting the patient.    Lactic Acid Results:No lab results found.    3 Hour Bundle 6 Hour Bundle (Reassessment)   Blood Cultures before IV Antibiotics: No, patient was already on broad spectrum antibiotics at sepsis time zero  Antibiotics given: see below  Prehospital fluid volume (mL):                     Total fluids given (ED +Pre-hospital):  The patient responded to a lesser volume of IV fluids. The initial volume ordered was 1000 mL.    Repeat Lactic Acid Level: Ordered by reflex for 2 hours after initial lactic acid collection.  Vasopressors: MAP>65 after initial IVF bolus, will continue to monitor fluid status and vital signs.  Repeat perfusion exam: I attest to having performed a repeat sepsis exam and assessment of perfusion at 2:35 PM .   BMI Readings from Last 1 Encounters:   04/11/25 35.95 kg/m        Anti-infectives (From admission through now)      Start     Dose/Rate Route Frequency Ordered Stop    04/11/25 1205  cefTRIAXone (ROCEPHIN) 2 g vial to attach to  ml bag for ADULTS or NS 50 ml bag for PEDS         2 g  over 30 Minutes Intravenous EVERY 24 HOURS 04/11/25 1204                   ED medications:  Medications   HYDROmorphone (PF) (DILAUDID) injection 0.5 mg (0.5 mg Intravenous $Given 4/11/25 1301)   cefTRIAXone (ROCEPHIN) 2 g vial to  attach to  ml bag for ADULTS or NS 50 ml bag for PEDS (2 g Intravenous $New Bag 4/11/25 1302)   sodium chloride 0.9 % infusion (has no administration in time range)   ondansetron (ZOFRAN) injection 4 mg (4 mg Intravenous $Given 4/11/25 1301)   sodium chloride 0.9% BOLUS 500 mL (500 mLs Intravenous $New Bag 4/11/25 1258)   iopamidol (ISOVUE-370) solution 80 mL (80 mLs Intravenous $Given 4/11/25 1325)   sodium chloride 0.9 % bag for CT scan flush (100 mLs As instructed $Given 4/11/25 1325)      ED Vitals:  Vitals:    04/11/25 1044 04/11/25 1200   BP: (!) 155/77 136/81   Pulse: 59 60   Resp: 16    Temp: 98.1  F (36.7  C)    SpO2: 96% 98%   Weight: 127 kg (280 lb)      Vitals:    04/11/25 1044 04/11/25 1200 04/11/25 1500 04/11/25 1644   BP: (!) 155/77 136/81 (!) 158/84 (!) 157/79   BP Location:    Left arm   Pulse: 59 60 65 74   Resp: 16   18   Temp: 98.1  F (36.7  C)   97.2  F (36.2  C)   TempSrc:    Oral   SpO2: 96% 98% 94% 98%   Weight: 127 kg (280 lb)   129.4 kg (285 lb 4.4 oz)         ED labs and imaging:  Results for orders placed or performed during the hospital encounter of 04/11/25   CT Pelvis Soft Tissue w Contrast     Status: None    Narrative    EXAM: CT PELVIS SOFT TISSUE W CONTRAST  LOCATION: [facility]  DATE: 4/11/2025    INDICATION: Worsening right scrotal swelling x 3d. Recent dx of epididymitis and cystitis.  Concern for scrotal cellulitis evaluate for necrotizing soft tissue infection  COMPARISON: Scrotal ultrasound 4/8/2025, CT 10/24/2025  TECHNIQUE: CT scan of the pelvis was performed with IV contrast. Multiplanar reformats were obtained. Dose reduction techniques were used.  CONTRAST: 80 mL Isovue 370     FINDINGS:    PELVIC ORGANS: Asymmetric edema and thickening of the right hemiscrotum without distinct fluid collection. No subcutaneous emphysema to suggest a necrotizing infection. Small right and trace left hydroceles. Normal prostate gland and seminal vesicles.    Trace right fat-containing inguinal hernia. No fat stranding within the inguinal canals. Normal inguinal and perianal soft tissues.    ADDITIONAL FINDINGS: Chronic sclerosis and osseous changes of the lower lumbar spine and sacrum without interval change. This is likely related to removed/revised hardware. No interval change. Anterior instrumented lumbosacral fusion hardware at L5-S1   and interbody fusion at L4-L5. The bowel is nondistended. Normal appendix. Colonic diverticulosis. No pelvic ascites or lymphadenopathy. Tiny fat-containing umbilical hernia.      Impression    IMPRESSION:  1.  Asymmetric edema and thickening of the right hemiscrotum without soft tissue fluid collection or evidence of a necrotizing infection.  2.  Small right and trace left hydroceles.         Comprehensive metabolic panel     Status: Normal   Result Value Ref Range    Sodium 138 135 - 145 mmol/L    Potassium 4.2 3.4 - 5.3 mmol/L    Carbon Dioxide (CO2) 24 22 - 29 mmol/L    Anion Gap 11 7 - 15 mmol/L    Urea Nitrogen 12.7 8.0 - 23.0 mg/dL    Creatinine 0.83 0.67 - 1.17 mg/dL    GFR Estimate >90 >60 mL/min/1.73m2    Calcium 8.8 8.8 - 10.4 mg/dL    Chloride 103 98 - 107 mmol/L    Glucose 87 70 - 99 mg/dL    Alkaline Phosphatase 50 40 - 150 U/L    AST 24 0 - 45 U/L    ALT 19 0 - 70 U/L    Protein Total 7.2 6.4 - 8.3 g/dL    Albumin 3.6 3.5 - 5.2 g/dL    Bilirubin Total 0.4 <=1.2 mg/dL   Procalcitonin     Status: Normal   Result Value Ref Range    Procalcitonin 0.03 <0.50 ng/mL   CRP inflammation     Status: Abnormal   Result Value Ref Range    CRP Inflammation 87.27 (H) <5.00 mg/L   CBC with platelets and differential     Status: Abnormal   Result Value Ref Range    WBC Count 7.7 4.0 - 11.0 10e3/uL    RBC Count 3.64 (L) 4.40 - 5.90 10e6/uL    Hemoglobin 11.4 (L) 13.3 - 17.7 g/dL    Hematocrit 33.4 (L) 40.0 - 53.0 %    MCV 92 78 - 100 fL    MCH 31.3 26.5 - 33.0 pg    MCHC 34.1 31.5 - 36.5 g/dL    RDW 12.8 10.0 - 15.0 %    Platelet Count  312 150 - 450 10e3/uL    % Neutrophils 65 %    % Lymphocytes 19 %    % Monocytes 8 %    % Eosinophils 6 %    % Basophils 1 %    % Immature Granulocytes 1 %    NRBCs per 100 WBC 0 <1 /100    Absolute Neutrophils 5.0 1.6 - 8.3 10e3/uL    Absolute Lymphocytes 1.5 0.8 - 5.3 10e3/uL    Absolute Monocytes 0.6 0.0 - 1.3 10e3/uL    Absolute Eosinophils 0.5 0.0 - 0.7 10e3/uL    Absolute Basophils 0.1 0.0 - 0.2 10e3/uL    Absolute Immature Granulocytes 0.1 <=0.4 10e3/uL    Absolute NRBCs 0.0 10e3/uL   CBC with platelets differential     Status: Abnormal    Narrative    The following orders were created for panel order CBC with platelets differential.  Procedure                               Abnormality         Status                     ---------                               -----------         ------                     CBC with platelets and ...[[id number]]  Abnormal            Final result               RBC and Platelet Morpho...[[id number]]                                                   Please view results for these tests on the individual orders.     Assessments & Plan (with Medical Decision Making)   Assessment Summary and clinical Impression: 70-year-old male who presented with concern of progressive right testicle swelling and discomfort after recent treatment for acute epididymitis of the right and acute cystitis with levofloxacin.  Upon arrival he was To be afebrile blood pressure was 155/77, 96% on room air.  Urine culture from this visit 3 days ago grew E. coli that was pan susceptible.  On exam he had exquisite tenderness in the superior pole of the right testicle with asymmetric swelling.  Circumcised male.  No perineal crepitus.  Given suspicion for worsening infection including consideration for necrotizing soft tissue infection with concern for failure of outpatient care he was started empirically on IV ceftriaxone and advanced imaging of the pelvis and perineum with contrast was obtained.   Workup today  revealed on CT soft tissue pelvis with contrast  asymmetric edema and thickening of the right hemiscrotum without soft tissue fluid collection or evidence of a necrotizing infection. Small right and trace left hydroceles.  Elevated CRP.  Patient was admitted to medicine with plan for intravenous antimicrobials with concern for failure of outpatient care with a working diagnosis of scrotal cellulitis in light of recent treatment for epididymitis and acute cystitis`.    ED course and plan:  Reviewed the medical record.  Reviewed visit on 4/8/25-patient had an ultrasound completed CT to [place] medical record revealing  Hyperemia of the right epididymis suggesting epididymitis. Small hydrocele on the right with a few internal septations.  See additional details outlined in radiology report.  Urinalysis and urine culture from 3 days earlier grew E. Coli- 50-100K CFU-pan susceptible.  We discussed my concern for failing outpatient care with empiric treatment with compliance reported 3 days earlier.  He started empirically on IV ceftriaxone for concern for scrotal cellulitis and advancement of the pelvis and perineum obtained with contrast to assess for necrotizing soft tissue infection.  Workup revealed normal procalcitonin,  CRP 87 normal metabolic profile.  Normal white count.  Hemoglobin 11.4.  CT soft tissue pelvis with contrast revealed asymmetric edema and thickening of the right hemiscrotum without soft tissue fluid collection or evidence of a necrotizing infection. Small right and trace left hydroceles.  See details in the radiology report.    With no discrete fluid collection and findings to suggest necrotizing soft tissue infection reviewed disposition plan  with the patient and spouse.  With concern for failed outpatient care is admitted to medicine IV antibiotic    Spoke with Dr Nelson- admitting provider at 2.30pm- who accepted patient for further inpatient care.  We agreed to inpatient care with broadening  coverage to include vancomycin to cover for Staph aureus with worsening symptoms although no convincing evidence for necrotizing infection on serial exam and  CT imaging with contrast.  MRSA swab pending.  No blood cultures obtained given patient received a dose of IV ceftriaxone, and no immediate need to consult urology given absence of abscess appreciated on imaging. Anticipated plan of care reviewed with patient and spouse.      Disclaimer: This note consists of symbols derived from keyboarding, dictation and/or voice recognition software. As a result, there may be errors in the script that have gone undetected. Please consider this when interpreting information found in this chart.   I have reviewed the nursing notes.    I have reviewed the findings, diagnosis, plan and need for follow up with the patient.           Medical Decision Making  The patient's presentation was of high complexity (progressive right testicular pain and swelling after evaluation 3 days early with treatment for acute cystitis and epididymitis).    The patient's evaluation involved:  ordering and/or review of 3+ test(s) in this encounter (blood work, intravenous medications, repeat comparison abdominal/scrotal ultrasound imaging)    The patient's management necessitated high risk (discussed with parent to consider inpatient admission for intravenous antibiotics given concern for failure of outpatient care).        New Prescriptions    No medications on file       Final diagnoses:   Cellulitis of scrotum - With concern for failure of outpatient care- Tx with levofloxacin 3 days earlier   Abnormal CT scan - IMPRESSION: 1.  Asymmetric edema and thickening of the right hemiscrotum without soft tissue fluid collection or evidence of a necrotizing infection. 2.  Small right and trace left hydroceles.         4/11/2025   [facility] EMERGENCY DEPT       Eric Cartwright MD  04/11/25 3090

## 2025-04-11 NOTE — H&P
"[facility]    History and Physical  Hospital Medicine       Date of Admission:  4/11/2025  Date of Service: 4/11/2025     Assessment & Plan   Alfonso Conn is a 70 year old male with a past medical history significant for hypertension, dyslipidemia, MGUS, cervical stenosis s/p spinal fusion, and obstructive sleep apnea. Recently diagnosed with epididymitis and acute cystitis on 04/08/25. Presented 04/11/25 with increased swelling, pain, erythema of right testicle despite good abx compliance.     Cellulitis of scrotum   Epididymitis  Recent E.coli UTI  Was seen in the emergency department on 4/8/25 and was diagnosed with epididymitis and UTI, urine culture positive for pan-sensitive E.coli.   Testicular ultrasound 4/8/25: \"1.  Hyperemia of the right epididymis suggesting epididymitis. 2.  Small hydrocele on the right with a few internal septations.\"  Was discharged on a course of Levaquin and has been compliant, despite complaince swelling and pain have worsened. Returned to emergency department 4/11 and has evidence of cellulitis of scrotum.    CT soft tissue pelvis: \"1.  Asymmetric edema and thickening of the right hemiscrotum without soft tissue fluid collection or evidence of a necrotizing infection. 2.  Small right and trace left hydroceles.\"  Afebrile, no leukocytosis (WBC 7.7). CRP 87.27. No evidence of crepitus on exam. Started on IV abx in the ED.   - Continue IV ceftriaxone   - discontinue vancomycin with negative MRSA swab.   - NS @ 125ml/hr   - No urology consult indicated at this time  - Analgesia: acetaminophen, ibuprofen, oxycodone 5-10mg q4hrs prn, hydromorphone 0.4mg q3hrs prn     Benign essential hypertension  Previously diagnosed, blood pressure slightly elevated. Managed prior to admission with irbesartan 300 mg q hs, nifedipine ER 30 mg qhs, and terazosin 2 mg q hs.  - Continue home medications with holding parameters    HLD (hyperlipidemia)  Managed prior to " admission with Zetia 10 mg q hs and Repatha injections q14 days.  - Continue Zetia  - Hold Repatha in the hospital, resume after discharge    Spinal stenosis of lumbar region with radiculopathy  S/P lumbar spinal fusion  Uses prn acetaminophen and ibuprofen for pain management, not on chronic narcotics.  - Stable, prn acetaminophen and ibuprofen available    MGUS (monoclonal gammopathy of unknown significance)  Follows with oncology through [facility] Dr. Debora England. Labs monitored q6 months.   - Continue with outpatient surveillance     CHELA (obstructive sleep apnea)  Compliant with CPAP, may continue using with home settings.       Clinically Significant Risk Factors Present on Admission                   # Hypertension: Noted on problem list                       Diet: Advance Diet as Tolerated: Regular Diet Adult=  DVT Prophylaxis: Pneumatic Compression Devices  Fofana Catheter: Not present  Code Status: Full Code    Disposition Plan   Medically Ready for Discharge: Anticipated in 2-4 Days       The patient's care was discussed with the Patient.  I have discussed patient and formulated plan with attending hospitalist physician, Dr. Shane Nelson     Review of past medical history by Kylee Bear PA-C.   History and physical exam by Lissa Sheldon PA-C .     This note was co-authored by Kylee Bear PA-C and Lissa Sheldon PA-C     Primary Care Physician   [phone]    History is obtained from the patient,  and review of old records via the EMR.    History of Present Illness   Alfonso Conn is a 70 year old male with a past medical history significant for hypertension, dyslipidemia, MGUS, cervical stenosis s/p spinal fusion, and obstructive sleep apnea. Recently diagnosed with epididymitis and acute cystitis on 04/08/25. Presented 04/11/25 with increased swelling, pain, erythema of right testicle despite good abx compliance.     Developed mild pain of  right testicle on 04/06/25, no traumatic injuries preceding. The pain progressed over the next few days and he began to note swelling and erythema of the right testicle. On 04/08/25, he was seen in the ED and diagnosed with epididymitis and acute cystitis. He was prescribed levofloxacin and oxycodone.   Returned to the ED on day of admission because despite good adherence with the abx, he has had interval increase in the swelling, erythema, and pain. Also notes subjective fevers and chills.  He describes a squeezing, sharp, throbbing pain radiating from right testicle to lower abdomen, down right thigh, and to right flank.    Was diagnosed with UTI while in [place] in early March, was experiencing dysuria, frequency, and urgency. Was treated with 10 day course of ciprofloxacin and reports improvement in the dysuria and frequency but has had persisting urgency.     He endorses chronic itching of groin for which he has been prescribed hydrocortisone cream. Reports the hydrocortisone cream provides some relief of the itching.     Denies penile discharge. Denies new exposures or sexual activities.     Endorses constipation, which he attributes to the oxycodone use. Has been using OTC stool softeners and laxatives for this.     Lives in a private residence with his wife. Endorses good medication compliance. Denies alcohol use, tobacco use, illicit drug use.     Review of Systems   The 10 point Review of Systems is negative other than noted in the HPI or here.     Past Medical History    Past Medical History:   Diagnosis Date    Hyperlipidemia     Hypertension     MGUS (monoclonal gammopathy of unknown significance)        Past Surgical History   Past Surgical History:   Procedure Laterality Date    AMPUTATION FINGER / THUMB      SPINAL FUSION  2009    [facility] LASER COAGULATION SURGERY PROSTATE, COMPLETE          Prior to Admission Medications   Prior to Admission Medications   Prescriptions Last Dose Informant Patient  Reported? Taking?   NIFEdipine ER OSMOTIC (PROCARDIA XL) 30 MG 24 hr tablet 4/10/2025 Bedtime Self Yes Yes   Sig: Take 1 tablet by mouth daily.   acetaminophen (TYLENOL) 325 MG tablet 4/11/2025 at  6:00 AM Self Yes Yes   Sig: Take 3 tablets by mouth every 6 hours as needed for mild pain.   evolocumab (REPATHA) 140 MG/ML prefilled autoinjector Morning Self Yes Yes   Sig: Inject 140 mg subcutaneously every 14 days.   ezetimibe (ZETIA) 10 MG tablet 4/10/2025 Bedtime Self Yes Yes   Sig: Take 1 tablet by mouth daily.   hydrocortisone 2.5 % cream Past Month Self Yes Yes   Sig: APPLY TO RASH IN JESSIE UP TO TWICE DAILY AS NEEDED FOR ITCHING   ibuprofen (ADVIL,MOTRIN) 200 MG tablet 4/11/2025 at  6:00 AM Self Yes Yes   Sig: Take 3 tablets by mouth every 8 hours as needed.   irbesartan (AVAPRO) 300 MG tablet 4/10/2025 Bedtime Self Yes Yes   Sig: Take 1 tablet by mouth daily.   levofloxacin (LEVAQUIN) 500 MG tablet 4/11/2025 Morning Self No Yes   Sig: Take 1 tablet (500 mg) by mouth daily for 14 days.   oxyCODONE (ROXICODONE) 5 MG tablet 4/10/2025 Bedtime Self No Yes   Sig: Take 1 tablet (5 mg) by mouth every 6 hours as needed for pain.   terazosin (HYTRIN) 2 MG capsule 4/10/2025 Bedtime Self Yes Yes   Sig: Take 1 capsule by mouth at bedtime.      Facility-Administered Medications: None     Allergies   Allergies   Allergen Reactions    Amlodipine Confusion, Other (See Comments) and Unknown    Atorvastatin Other (See Comments)    Chlorthalidone Other (See Comments)     Shortness of breath, difficulty thinking, full-body pain, fatigue    Gabapentin Other (See Comments)     Difficulty concentrating    Lisinopril Cough    Losartan Potassium-Hctz Other (See Comments)     Mouth tingling; leg weakness; slurred words    Metoprolol Other (See Comments)    Pravastatin Muscle Pain (Myalgia) and Other (See Comments)    Bupropion Hives and Rash     Took more than was prescribed    Wellbutrin       Family History    Family History   Problem  Relation Age of Onset    Cerebrovascular Disease Mother     Cancer Mother     Cancer Brother        Social History   Social History     Socioeconomic History    Marital status:      Spouse name: Not on file    Number of children: Not on file    Years of education: Not on file    Highest education level: Not on file   Occupational History    Not on file   Tobacco Use    Smoking status: Former     Current packs/day: 0.00     Types: Cigarettes     Quit date:      Years since quittin.2    Smokeless tobacco: Never   Substance and Sexual Activity    Alcohol use: Never    Drug use: Never    Sexual activity: Not on file   Other Topics Concern    Not on file   Social History Narrative    Not on file     Social Drivers of Health     Financial Resource Strain: Low Risk  (2025)    Financial Resource Strain     Within the past 12 months, have you or your family members you live with been unable to get utilities (heat, electricity) when it was really needed?: No   Food Insecurity: Low Risk  (2025)    Food Insecurity     Within the past 12 months, did you worry that your food would run out before you got money to buy more?: No     Within the past 12 months, did the food you bought just not last and you didn t have money to get more?: No   Transportation Needs: Low Risk  (2025)    Transportation Needs     Within the past 12 months, has lack of transportation kept you from medical appointments, getting your medicines, non-medical meetings or appointments, work, or from getting things that you need?: No   Physical Activity: Not on file   Stress: Not on file   Social Connections: Not on file   Interpersonal Safety: Low Risk  (2025)    Interpersonal Safety     Do you feel physically and emotionally safe where you currently live?: Yes     Within the past 12 months, have you been hit, slapped, kicked or otherwise physically hurt by someone?: No     Within the past 12 months, have you been humiliated or  emotionally abused in other ways by your partner or ex-partner?: No   Housing Stability: Low Risk  (4/11/2025)    Housing Stability     Do you have housing? : Yes     Are you worried about losing your housing?: No       Physical Exam   BP (!) 151/77 (BP Location: Right arm)   Pulse 80   Temp 98.7  F (37.1  C) (Oral)   Resp 16   Wt 129.4 kg (285 lb 4.4 oz)   SpO2 93%   BMI 36.63 kg/m       Weight: 285 lbs 4.4 oz Body mass index is 36.63 kg/m .     Constitutional: Well developed obese adult. Pleasant, responding to questions appropriately, speaking in full sentences without difficulty. Alert and oriented, appears comfortable, nontoxic, no acute distress.  Eyes: Eyes are clear, no scleral icterus, pupils are reactive.  HENT: Oropharynx is clear. No evidence of cranial trauma.   Cardiovascular: Regular rate and rhythm, normal S1 and S2, and no murmur, rub, or gallops noted. Radial & dorsalis pedis pulses palpable bilaterally. No pitting lower extremity edema.  Respiratory: Respirations unlabored on room air, Clear to auscultation bilaterally without wheezes, crackles, rhonchi.   GI: Active bowel sounds throughout. Soft, not distended. Tenderness to palpation of suprapubic area and right lower quadrant, without rebound or guarding. No CVA tenderness.   Genitourinary: Right testicle enlarged and tender. Does have vertical lie, no crepitus.   Musculoskeletal: Normal muscle bulk and tone. Moves all extremities spontaneously. No gross deformity.  Skin: Warm and dry. Erythematous scrotum  Neurologic: Neck supple.  is symmetric. No notable tremor. Speech is clear and fluent.        Data   Data reviewed today:     I have personally reviewed the following data over the past 24 hrs:    7.7  \   11.4 (L)   / 312     138 103 12.7 /  87   4.2 24 0.83 \     ALT: 19 AST: 24 AP: 50 TBILI: 0.4   ALB: 3.6 TOT PROTEIN: 7.2 LIPASE: N/A     Procal: 0.03 CRP: 87.27 (H) Lactic Acid: N/A           Recent Results (from the past 24  hours)   CT Pelvis Soft Tissue w Contrast    Narrative    EXAM: CT PELVIS SOFT TISSUE W CONTRAST  LOCATION: [facility]  DATE: 4/11/2025    INDICATION: Worsening right scrotal swelling x 3d. Recent dx of epididymitis and cystitis.  Concern for scrotal cellulitis evaluate for necrotizing soft tissue infection  COMPARISON: Scrotal ultrasound 4/8/2025, CT 10/24/2025  TECHNIQUE: CT scan of the pelvis was performed with IV contrast. Multiplanar reformats were obtained. Dose reduction techniques were used.  CONTRAST: 80 mL Isovue 370     FINDINGS:    PELVIC ORGANS: Asymmetric edema and thickening of the right hemiscrotum without distinct fluid collection. No subcutaneous emphysema to suggest a necrotizing infection. Small right and trace left hydroceles. Normal prostate gland and seminal vesicles.   Trace right fat-containing inguinal hernia. No fat stranding within the inguinal canals. Normal inguinal and perianal soft tissues.    ADDITIONAL FINDINGS: Chronic sclerosis and osseous changes of the lower lumbar spine and sacrum without interval change. This is likely related to removed/revised hardware. No interval change. Anterior instrumented lumbosacral fusion hardware at L5-S1   and interbody fusion at L4-L5. The bowel is nondistended. Normal appendix. Colonic diverticulosis. No pelvic ascites or lymphadenopathy. Tiny fat-containing umbilical hernia.      Impression    IMPRESSION:  1.  Asymmetric edema and thickening of the right hemiscrotum without soft tissue fluid collection or evidence of a necrotizing infection.  2.  Small right and trace left hydroceles.             I personally reviewed no images or EKG's today

## 2025-04-11 NOTE — ED NOTES
[facility]   Admission Handoff    The patient is Alfonso Conn, 70 year old who arrived in the ED by CAR from home with a complaint of Groin Swelling  . The patient's current symptoms are a recurrence of a past episode and during this time the symptoms have remained the same. In the ED, patient was diagnosed with   Final diagnoses:   Cellulitis of scrotum - With concern for failure of outpatient care- Tx with levofloxacin 3 days earlier   Abnormal CT scan - IMPRESSION: 1.  Asymmetric edema and thickening of the right hemiscrotum without soft tissue fluid collection or evidence of a necrotizing infection. 2.  Small right and trace left hydroceles.           Needed?: No    Allergies:    Allergies   Allergen Reactions    Amlodipine Confusion, Other (See Comments) and Unknown    Atorvastatin Other (See Comments)    Chlorthalidone Other (See Comments)     Shortness of breath, difficulty thinking, full-body pain, fatigue    Gabapentin Other (See Comments)     Difficulty concentrating    Lisinopril Cough    Losartan Potassium-Hctz Other (See Comments)     Mouth tingling; leg weakness; slurred words    Metoprolol Other (See Comments)    Pravastatin Muscle Pain (Myalgia) and Other (See Comments)    Bupropion Hives and Rash     Took more than was prescribed    Wellbutrin       Past Medical Hx:   Past Medical History:   Diagnosis Date    Hyperlipidemia     Hypertension     MGUS (monoclonal gammopathy of unknown significance)        Initial vitals were: BP: (!) 155/77  Pulse: 59  Temp: 98.1  F (36.7  C)  Resp: 16  Weight: 127 kg (280 lb)  SpO2: 96 %   Recent vital Signs: /81   Pulse 60   Temp 98.1  F (36.7  C)   Resp 16   Wt 127 kg (280 lb)   SpO2 98%   BMI 35.95 kg/m      Elimination Status: Continent: Yes     Activity Level: SBA    Fall Status: Reason for falls risk:  Mobility and Reason for falls risk: High Risk Medications  bed/chair alarm on, nonskid shoes/slippers when  out of bed, arm band in place, patient and family education, assistive device/personal items within reach, and activity supervised    Baseline Mental status: WDL  Current Mental Status changes: at basesline    Infection present or suspected this encounter: yes other scrotum   Sepsis suspected: No    Isolation type: NA    Bariatric equipment needed?: No    In the ED these meds were given:   Medications   HYDROmorphone (PF) (DILAUDID) injection 0.5 mg (0.5 mg Intravenous $Given 4/11/25 1301)   cefTRIAXone (ROCEPHIN) 2 g vial to attach to  ml bag for ADULTS or NS 50 ml bag for PEDS (2 g Intravenous $New Bag 4/11/25 1302)   sodium chloride 0.9 % infusion (has no administration in time range)   vancomycin (VANCOCIN) 1,500 mg in 0.9% NaCl 250 mL intermittent infusion (has no administration in time range)   ondansetron (ZOFRAN) injection 4 mg (4 mg Intravenous $Given 4/11/25 1301)   sodium chloride 0.9% BOLUS 500 mL (500 mLs Intravenous $New Bag 4/11/25 1258)   iopamidol (ISOVUE-370) solution 80 mL (80 mLs Intravenous $Given 4/11/25 1325)   sodium chloride 0.9 % bag for CT scan flush (100 mLs As instructed $Given 4/11/25 1325)       Drips running?  No    Home pump  No    Current LDAs: Peripheral IV: Site RAC; Gauge 18  normal saline     Results:   Labs/Imaging  Ordered and Resulted from Time of ED Arrival Up to the Time of Departure from the ED  Results for orders placed or performed during the hospital encounter of 04/11/25 (from the past 24 hours)   CBC with platelets differential    Narrative    The following orders were created for panel order CBC with platelets differential.  Procedure                               Abnormality         Status                     ---------                               -----------         ------                     CBC with platelets and ...[[id number]]  Abnormal            Final result               RBC and Platelet Morpho...[[id number]]                                                    Please view results for these tests on the individual orders.   Comprehensive metabolic panel   Result Value Ref Range    Sodium 138 135 - 145 mmol/L    Potassium 4.2 3.4 - 5.3 mmol/L    Carbon Dioxide (CO2) 24 22 - 29 mmol/L    Anion Gap 11 7 - 15 mmol/L    Urea Nitrogen 12.7 8.0 - 23.0 mg/dL    Creatinine 0.83 0.67 - 1.17 mg/dL    GFR Estimate >90 >60 mL/min/1.73m2    Calcium 8.8 8.8 - 10.4 mg/dL    Chloride 103 98 - 107 mmol/L    Glucose 87 70 - 99 mg/dL    Alkaline Phosphatase 50 40 - 150 U/L    AST 24 0 - 45 U/L    ALT 19 0 - 70 U/L    Protein Total 7.2 6.4 - 8.3 g/dL    Albumin 3.6 3.5 - 5.2 g/dL    Bilirubin Total 0.4 <=1.2 mg/dL   Procalcitonin   Result Value Ref Range    Procalcitonin 0.03 <0.50 ng/mL   CRP inflammation   Result Value Ref Range    CRP Inflammation 87.27 (H) <5.00 mg/L   CBC with platelets and differential   Result Value Ref Range    WBC Count 7.7 4.0 - 11.0 10e3/uL    RBC Count 3.64 (L) 4.40 - 5.90 10e6/uL    Hemoglobin 11.4 (L) 13.3 - 17.7 g/dL    Hematocrit 33.4 (L) 40.0 - 53.0 %    MCV 92 78 - 100 fL    MCH 31.3 26.5 - 33.0 pg    MCHC 34.1 31.5 - 36.5 g/dL    RDW 12.8 10.0 - 15.0 %    Platelet Count 312 150 - 450 10e3/uL    % Neutrophils 65 %    % Lymphocytes 19 %    % Monocytes 8 %    % Eosinophils 6 %    % Basophils 1 %    % Immature Granulocytes 1 %    NRBCs per 100 WBC 0 <1 /100    Absolute Neutrophils 5.0 1.6 - 8.3 10e3/uL    Absolute Lymphocytes 1.5 0.8 - 5.3 10e3/uL    Absolute Monocytes 0.6 0.0 - 1.3 10e3/uL    Absolute Eosinophils 0.5 0.0 - 0.7 10e3/uL    Absolute Basophils 0.1 0.0 - 0.2 10e3/uL    Absolute Immature Granulocytes 0.1 <=0.4 10e3/uL    Absolute NRBCs 0.0 10e3/uL   CT Pelvis Soft Tissue w Contrast    Narrative    EXAM: CT PELVIS SOFT TISSUE W CONTRAST  LOCATION: [facility]  DATE: 4/11/2025    INDICATION: Worsening right scrotal swelling x 3d. Recent dx of epididymitis and cystitis.  Concern for scrotal cellulitis evaluate for  necrotizing soft tissue infection  COMPARISON: Scrotal ultrasound 4/8/2025, CT 10/24/2025  TECHNIQUE: CT scan of the pelvis was performed with IV contrast. Multiplanar reformats were obtained. Dose reduction techniques were used.  CONTRAST: 80 mL Isovue 370     FINDINGS:    PELVIC ORGANS: Asymmetric edema and thickening of the right hemiscrotum without distinct fluid collection. No subcutaneous emphysema to suggest a necrotizing infection. Small right and trace left hydroceles. Normal prostate gland and seminal vesicles.   Trace right fat-containing inguinal hernia. No fat stranding within the inguinal canals. Normal inguinal and perianal soft tissues.    ADDITIONAL FINDINGS: Chronic sclerosis and osseous changes of the lower lumbar spine and sacrum without interval change. This is likely related to removed/revised hardware. No interval change. Anterior instrumented lumbosacral fusion hardware at L5-S1   and interbody fusion at L4-L5. The bowel is nondistended. Normal appendix. Colonic diverticulosis. No pelvic ascites or lymphadenopathy. Tiny fat-containing umbilical hernia.      Impression    IMPRESSION:  1.  Asymmetric edema and thickening of the right hemiscrotum without soft tissue fluid collection or evidence of a necrotizing infection.  2.  Small right and trace left hydroceles.             For the majority of the shift this patient's behavior was Green     Cardiac Rhythm: Normal Sinus  Pt needs tele? Yes  Skin/wound Issues:  scrotal edema    Code Status: Full Code    Pain control: fair    Nausea control: good    Abnormal labs/tests/findings requiring intervention: CT    Patient tested for COVID 19 prior to admission: YES     OBS brochure/video discussed/provided to patient/family: N/A     Family present during ED course? Yes     Family Comments/Social Situation comments: lives at home with spouse    Tasks needing completion: None    Jessica Borjas RN

## 2025-04-12 LAB
ANION GAP SERPL CALCULATED.3IONS-SCNC: 12 MMOL/L (ref 7–15)
BASOPHILS # BLD AUTO: 0.1 10E3/UL (ref 0–0.2)
BASOPHILS NFR BLD AUTO: 1 %
BUN SERPL-MCNC: 9.2 MG/DL (ref 8–23)
CALCIUM SERPL-MCNC: 8.3 MG/DL (ref 8.8–10.4)
CHLORIDE SERPL-SCNC: 104 MMOL/L (ref 98–107)
CREAT SERPL-MCNC: 0.88 MG/DL (ref 0.67–1.17)
CRP SERPL-MCNC: 58.73 MG/L
EGFRCR SERPLBLD CKD-EPI 2021: >90 ML/MIN/1.73M2
EOSINOPHIL # BLD AUTO: 0.5 10E3/UL (ref 0–0.7)
EOSINOPHIL NFR BLD AUTO: 7 %
ERYTHROCYTE [DISTWIDTH] IN BLOOD BY AUTOMATED COUNT: 12.6 % (ref 10–15)
GLUCOSE SERPL-MCNC: 99 MG/DL (ref 70–99)
HCO3 SERPL-SCNC: 22 MMOL/L (ref 22–29)
HCT VFR BLD AUTO: 34.2 % (ref 40–53)
HGB BLD-MCNC: 11.5 G/DL (ref 13.3–17.7)
IMM GRANULOCYTES # BLD: 0.1 10E3/UL
IMM GRANULOCYTES NFR BLD: 1 %
LYMPHOCYTES # BLD AUTO: 1.7 10E3/UL (ref 0.8–5.3)
LYMPHOCYTES NFR BLD AUTO: 23 %
MCH RBC QN AUTO: 30.7 PG (ref 26.5–33)
MCHC RBC AUTO-ENTMCNC: 33.6 G/DL (ref 31.5–36.5)
MCV RBC AUTO: 91 FL (ref 78–100)
MONOCYTES # BLD AUTO: 0.7 10E3/UL (ref 0–1.3)
MONOCYTES NFR BLD AUTO: 9 %
NEUTROPHILS # BLD AUTO: 4.5 10E3/UL (ref 1.6–8.3)
NEUTROPHILS NFR BLD AUTO: 59 %
NRBC # BLD AUTO: 0 10E3/UL
NRBC BLD AUTO-RTO: 0 /100
PLATELET # BLD AUTO: 333 10E3/UL (ref 150–450)
POTASSIUM SERPL-SCNC: 3.7 MMOL/L (ref 3.4–5.3)
RBC # BLD AUTO: 3.74 10E6/UL (ref 4.4–5.9)
SODIUM SERPL-SCNC: 138 MMOL/L (ref 135–145)
WBC # BLD AUTO: 7.5 10E3/UL (ref 4–11)

## 2025-04-12 PROCEDURE — 258N000003 HC RX IP 258 OP 636: Performed by: PHYSICIAN ASSISTANT

## 2025-04-12 PROCEDURE — 99232 SBSQ HOSP IP/OBS MODERATE 35: CPT | Performed by: INTERNAL MEDICINE

## 2025-04-12 PROCEDURE — 85018 HEMOGLOBIN: CPT

## 2025-04-12 PROCEDURE — 250N000011 HC RX IP 250 OP 636: Performed by: PHYSICIAN ASSISTANT

## 2025-04-12 PROCEDURE — 120N000001 HC R&B MED SURG/OB

## 2025-04-12 PROCEDURE — 250N000013 HC RX MED GY IP 250 OP 250 PS 637: Performed by: PHYSICIAN ASSISTANT

## 2025-04-12 PROCEDURE — 250N000013 HC RX MED GY IP 250 OP 250 PS 637

## 2025-04-12 PROCEDURE — 86140 C-REACTIVE PROTEIN: CPT | Performed by: PHYSICIAN ASSISTANT

## 2025-04-12 PROCEDURE — 82310 ASSAY OF CALCIUM: CPT

## 2025-04-12 PROCEDURE — 36415 COLL VENOUS BLD VENIPUNCTURE: CPT

## 2025-04-12 RX ORDER — OXYCODONE HYDROCHLORIDE 5 MG/1
5 TABLET ORAL EVERY 4 HOURS PRN
Status: DISCONTINUED | OUTPATIENT
Start: 2025-04-12 | End: 2025-04-13 | Stop reason: HOSPADM

## 2025-04-12 RX ORDER — HYDROMORPHONE HCL IN WATER/PF 6 MG/30 ML
0.4 PATIENT CONTROLLED ANALGESIA SYRINGE INTRAVENOUS
Status: DISCONTINUED | OUTPATIENT
Start: 2025-04-12 | End: 2025-04-12

## 2025-04-12 RX ORDER — OXYCODONE HYDROCHLORIDE 5 MG/1
10 TABLET ORAL EVERY 4 HOURS PRN
Status: DISCONTINUED | OUTPATIENT
Start: 2025-04-12 | End: 2025-04-13 | Stop reason: HOSPADM

## 2025-04-12 RX ORDER — HYDROCORTISONE 25 MG/G
CREAM TOPICAL 2 TIMES DAILY PRN
Status: DISCONTINUED | OUTPATIENT
Start: 2025-04-12 | End: 2025-04-13 | Stop reason: HOSPADM

## 2025-04-12 RX ADMIN — OXYCODONE HYDROCHLORIDE 10 MG: 5 TABLET ORAL at 07:41

## 2025-04-12 RX ADMIN — OXYCODONE HYDROCHLORIDE 10 MG: 5 TABLET ORAL at 16:20

## 2025-04-12 RX ADMIN — OXYCODONE HYDROCHLORIDE 10 MG: 5 TABLET ORAL at 01:37

## 2025-04-12 RX ADMIN — OXYCODONE HYDROCHLORIDE 10 MG: 5 TABLET ORAL at 11:37

## 2025-04-12 RX ADMIN — SODIUM CHLORIDE 1000 ML: 0.9 INJECTION, SOLUTION INTRAVENOUS at 01:35

## 2025-04-12 RX ADMIN — EZETIMIBE 10 MG: 10 TABLET ORAL at 22:04

## 2025-04-12 RX ADMIN — IBUPROFEN 400 MG: 400 TABLET, FILM COATED ORAL at 16:20

## 2025-04-12 RX ADMIN — OXYCODONE HYDROCHLORIDE 10 MG: 5 TABLET ORAL at 20:21

## 2025-04-12 RX ADMIN — TERAZOSIN HYDROCHLORIDE 2 MG: 2 CAPSULE ORAL at 22:05

## 2025-04-12 RX ADMIN — IRBESARTAN 300 MG: 75 TABLET ORAL at 22:04

## 2025-04-12 RX ADMIN — ACETAMINOPHEN 650 MG: 325 TABLET ORAL at 11:37

## 2025-04-12 RX ADMIN — CEFTRIAXONE SODIUM 2 G: 2 INJECTION, POWDER, FOR SOLUTION INTRAMUSCULAR; INTRAVENOUS at 11:38

## 2025-04-12 RX ADMIN — NIFEDIPINE 30 MG: 30 TABLET, FILM COATED, EXTENDED RELEASE ORAL at 22:04

## 2025-04-12 RX ADMIN — IBUPROFEN 400 MG: 400 TABLET, FILM COATED ORAL at 07:41

## 2025-04-12 RX ADMIN — SENNOSIDES AND DOCUSATE SODIUM 1 TABLET: 50; 8.6 TABLET ORAL at 07:41

## 2025-04-12 ASSESSMENT — ACTIVITIES OF DAILY LIVING (ADL)
ADLS_ACUITY_SCORE: 22
ADLS_ACUITY_SCORE: 37
ADLS_ACUITY_SCORE: 37
ADLS_ACUITY_SCORE: 22
ADLS_ACUITY_SCORE: 22
ADLS_ACUITY_SCORE: 37
ADLS_ACUITY_SCORE: 22
ADLS_ACUITY_SCORE: 37
ADLS_ACUITY_SCORE: 22
ADLS_ACUITY_SCORE: 22
ADLS_ACUITY_SCORE: 37
ADLS_ACUITY_SCORE: 22
ADLS_ACUITY_SCORE: 37
ADLS_ACUITY_SCORE: 37
ADLS_ACUITY_SCORE: 22
ADLS_ACUITY_SCORE: 37

## 2025-04-12 NOTE — PROGRESS NOTES
Patient is alert and orientated, he will let his needs be known. He is up independently. He has had Dilaudid and Oxy for pain, and it has helped bring it down to an 8/10. He wears a CPAP at night. He has a right PIV that is running NS at 125ml/hr. His scrotum on the right is still red and inflamed, will continue to monitor.

## 2025-04-12 NOTE — PROGRESS NOTES
End Of Shift Note       Neuro: WNL. No concerns, HA's or dizziness.     Scrotal pain improved from NOC now that switched from dilaudid to oxycodone. Rating around 5 at rest and 8 with activity while on meds.      Cardiac: Afebrile. /63 (BP Location: Left arm)   Pulse 63   Temp 98  F (36.7  C) (Oral)   Resp 16   Wt 129.4 kg (285 lb 4.4 oz)   SpO2 93%   BMI 36.63 kg/m         Resp: RRR satting above 92% on RA. Denies cold/fl sx.      GI/: Good appetite. Denied GI upset. Took stool softeners PRN this AM preventativley b/c of the pain meds.      MSK: WNL.      Skin: Took a warm shower this morning. Scrotum red and inflamed, swollen. No open areas or drainage noted. Declined ice or heat at this time. States he had tried it. Declined inter dry or trying to elevate the area. Stats walking/ standing worsens pressure and tugs things downward causing more pain so has been resting most of today.       LDAs: PIV CDI- NS infusing at 125. ABX given q 24 hrs.

## 2025-04-12 NOTE — PROGRESS NOTES
"[facility] Medicine Progress Note  Date of Service: 04/12/2025    Assessment & Plan   Alfonso Conn is a 70 year old male with a past medical history significant for hypertension, dyslipidemia, MGUS, cervical stenosis s/p spinal fusion, and obstructive sleep apnea. Recently diagnosed with epididymitis and acute cystitis on 04/08/25. Presented 04/11/25 with increased swelling, pain, erythema of right testicle despite good abx compliance.     Cellulitis of scrotum   Epididymitis  Recent E coli UTI    Was seen in the emergency department on 4/8/25 and was diagnosed with epididymitis and UTI, urine culture positive for pan-sensitive E.coli.     Testicular ultrasound 4/8/25: \"Hyperemia of the right epididymis suggesting epididymitis. Small hydrocele on the right with a few internal septations.\"    Was discharged on a course of Levaquin and has been compliant, but swelling and pain progressively worsened. Returned to emergency department 4/11 and has evidence of cellulitis of scrotum.    CT soft tissue pelvis: \"Asymmetric edema and thickening of the right hemiscrotum without soft tissue fluid collection or evidence of a necrotizing infection. Small right and trace left hydroceles.\"    Afebrile, no leukocytosis (WBC 7.7). CRP 87.27. No evidence of crepitus on exam. Started on IV ceftriaxone and vancomycin in the ED. Discontinued vancomycin with negative MRSA swab.    Clinically improving with less pain and much less tenderness. CRP trending down.  - Continue IV ceftriaxone   - No urology consult indicated at this time  - Analgesia: acetaminophen, ibuprofen, oxycodone 5-10mg q4hrs prn, hydromorphone 0.4mg q3hrs prn     Benign essential hypertension    Previously diagnosed, blood pressure slightly elevated. Managed prior to admission with irbesartan 300 mg q hs, nifedipine ER 30 mg qhs, and terazosin 2 mg q hs.  - Continue home medications with holding parameters    HLD " (hyperlipidemia)    Managed prior to admission with Zetia 10 mg q hs and Repatha injections q14 days.  - Continue Zetia  - Hold Repatha in the hospital, resume after discharge    Spinal stenosis of lumbar region with radiculopathy  S/P lumbar spinal fusion    Uses prn acetaminophen and ibuprofen for pain management, not on chronic narcotics.  - Stable, prn acetaminophen and ibuprofen available    MGUS (monoclonal gammopathy of unknown significance)    Follows with oncology through [facility] Dr. Debora England. Labs monitored q6 months.   - Continue with outpatient surveillance     CHELA (obstructive sleep apnea)    Compliant with CPAP, may continue using with home settings.     Clinically Significant Risk Factors           # Hypocalcemia: Lowest Ca = 8.3 mg/dL in last 2 days, will monitor and replace as appropriate         # Hypertension: Noted on problem list                       Diet: Advance Diet as Tolerated: Regular Diet Adult    DVT Prophylaxis: Low Risk/Ambulatory with no VTE prophylaxis indicated  Fofana Catheter: Not present  Lines: None     Cardiac Monitoring: None  Code Status: Full Code      Discussion/Disposition: If continues to improve, likely home tomorrow on oral antibiotics.    Medically Ready for Discharge: Anticipated Tomorrow         Medical Decision Making       30 MINUTES SPENT BY ME on the date of service doing chart review, history, exam, documentation & further activities per the note.        William Cole MD  [facility] Medicine    [facility]  Securely message with [employer] (more info)  Text page via [employer] Paging/Directory     Interval History   HPI reviewed again with patient.   Pain much improved though still needing pain medications - oxycodone working well. No fever or chills.     Physical Exam   Temp:  [97.2  F (36.2  C)-98.7  F (37.1  C)] 98  F (36.7  C)  Pulse:  [63-80] 63  Resp:  [16-18] 16  BP: (119-158)/(63-88) 119/63  SpO2:  [93 %-98 %] 93 %    Weights:    Vitals:    04/11/25 1044 04/11/25 1644   Weight: 127 kg (280 lb) 129.4 kg (285 lb 4.4 oz)    Body mass index is 36.63 kg/m .    Constitutional: alert and oriented, NAD, nontoxic  : mild erythema and induration of right scrotum, mild tenderness right scrotum to mild-mod palpation    Data   Recent Labs   Lab 04/12/25  0501 04/11/25  1320 04/11/25  1226 04/08/25  0815   WBC 7.5 7.7  --  12.4*   HGB 11.5* 11.4*  --  12.7*   MCV 91 92  --  91    312  --  282     --  138 137   POTASSIUM 3.7  --  4.2 4.0   CHLORIDE 104  --  103 104   CO2 22  --  24 25   BUN 9.2  --  12.7 15.2   CR 0.88  --  0.83 0.90   ANIONGAP 12  --  11 8   MALIKA 8.3*  --  8.8 8.9   GLC 99  --  87 103*   ALBUMIN  --   --  3.6  --    PROTTOTAL  --   --  7.2  --    BILITOTAL  --   --  0.4  --    ALKPHOS  --   --  50  --    ALT  --   --  19  --    AST  --   --  24  --        Recent Labs   Lab 04/12/25  0501 04/11/25  1226 04/08/25  0815   GLC 99 87 103*        Unresulted Labs Ordered in the Past 30 Days of this Admission       No orders found for last 31 day(s).             Imaging: No results found for this or any previous visit (from the past 24 hours).     I reviewed all new labs and imaging results over the last 24 hours. I personally reviewed no images or EKG's today.    Medications   Current Facility-Administered Medications   Medication Dose Route Frequency Provider Last Rate Last Admin     Current Facility-Administered Medications   Medication Dose Route Frequency Provider Last Rate Last Admin    cefTRIAXone (ROCEPHIN) 2 g vial to attach to  ml bag for ADULTS or NS 50 ml bag for PEDS  2 g Intravenous Q24H Kylee Bear PA-C   2 g at 04/12/25 1138    ezetimibe (ZETIA) tablet 10 mg  10 mg Oral At Bedtime Kylee Bear PA-C   10 mg at 04/11/25 2217    irbesartan (AVAPRO) tablet 300 mg  300 mg Oral At Bedtime Kylee Bear PA-C   300 mg at 04/11/25 2218    NIFEdipine ER OSMOTIC (PROCARDIA XL) 24 hr tablet 30  mg  30 mg Oral At Bedtime Kylee Bear PA-C   30 mg at 04/11/25 2219    sodium chloride (PF) 0.9% PF flush 3 mL  3 mL Intracatheter Q8H Lissa Watkins PA-C        terazosin (HYTRIN) capsule 2 mg  2 mg Oral At Bedtime Kylee Bear PA-C   2 mg at 04/11/25 2218       William Cole MD  [facility] Medicine

## 2025-04-13 VITALS
OXYGEN SATURATION: 92 % | HEART RATE: 61 BPM | DIASTOLIC BLOOD PRESSURE: 69 MMHG | TEMPERATURE: 98 F | SYSTOLIC BLOOD PRESSURE: 146 MMHG | WEIGHT: 285.27 LBS | BODY MASS INDEX: 36.63 KG/M2 | RESPIRATION RATE: 16 BRPM

## 2025-04-13 PROCEDURE — 250N000013 HC RX MED GY IP 250 OP 250 PS 637: Performed by: PHYSICIAN ASSISTANT

## 2025-04-13 PROCEDURE — 250N000011 HC RX IP 250 OP 636: Performed by: PHYSICIAN ASSISTANT

## 2025-04-13 PROCEDURE — 250N000013 HC RX MED GY IP 250 OP 250 PS 637

## 2025-04-13 PROCEDURE — 99238 HOSP IP/OBS DSCHRG MGMT 30/<: CPT | Performed by: INTERNAL MEDICINE

## 2025-04-13 RX ORDER — OXYCODONE HYDROCHLORIDE 5 MG/1
5-10 TABLET ORAL 4 TIMES DAILY PRN
Qty: 24 TABLET | Refills: 0 | Status: SHIPPED | OUTPATIENT
Start: 2025-04-13

## 2025-04-13 RX ORDER — SULFAMETHOXAZOLE AND TRIMETHOPRIM 800; 160 MG/1; MG/1
1 TABLET ORAL 2 TIMES DAILY
Qty: 14 TABLET | Refills: 0 | Status: SHIPPED | OUTPATIENT
Start: 2025-04-13 | End: 2025-04-17

## 2025-04-13 RX ADMIN — IBUPROFEN 400 MG: 400 TABLET, FILM COATED ORAL at 08:25

## 2025-04-13 RX ADMIN — OXYCODONE HYDROCHLORIDE 10 MG: 5 TABLET ORAL at 10:35

## 2025-04-13 RX ADMIN — SENNOSIDES AND DOCUSATE SODIUM 2 TABLET: 50; 8.6 TABLET ORAL at 10:47

## 2025-04-13 RX ADMIN — ACETAMINOPHEN 650 MG: 325 TABLET ORAL at 10:35

## 2025-04-13 RX ADMIN — HYDROCORTISONE: 25 CREAM TOPICAL at 06:17

## 2025-04-13 RX ADMIN — CEFTRIAXONE SODIUM 2 G: 2 INJECTION, POWDER, FOR SOLUTION INTRAMUSCULAR; INTRAVENOUS at 12:22

## 2025-04-13 RX ADMIN — OXYCODONE HYDROCHLORIDE 10 MG: 5 TABLET ORAL at 06:12

## 2025-04-13 ASSESSMENT — ACTIVITIES OF DAILY LIVING (ADL)
ADLS_ACUITY_SCORE: 37

## 2025-04-13 NOTE — DISCHARGE SUMMARY
"[facility]  Discharge Summary  Hospital Medicine       Date of Admission:  4/11/2025  Date of Discharge:  4/13/2025  Discharging Provider: William Cole MD      Identification and Chief Compaint: Alfonso Conn is a 70 year old male who presented on 4/11/2025 with complaint of ***.    Discharge Diagnoses   ***     Follow-ups Needed After Discharge   Follow-up Appointments       Follow Up      Follow up with Urology, on 4/17/25 as scheduled. for hospital follow- up. No follow up labs or test are needed.              Hospital Course   Alfonso Conn is a 70 year old male with a past medical history significant for hypertension, dyslipidemia, MGUS, cervical stenosis s/p spinal fusion, and obstructive sleep apnea. Recently diagnosed with epididymitis and acute cystitis on 04/08/25. Presented 04/11/25 with increased swelling, pain, erythema of right testicle despite good abx compliance.     Cellulitis of scrotum   Epididymitis  Recent E coli UTI    Was seen in the emergency department on 4/8/25 and was diagnosed with epididymitis and UTI, urine culture positive for pan-sensitive E.coli.     Testicular ultrasound 4/8/25: \"Hyperemia of the right epididymis suggesting epididymitis. Small hydrocele on the right with a few internal septations.\"    Was discharged on a course of Levaquin and has been compliant, but swelling and pain progressively worsened. Returned to emergency department 4/11 and has evidence of cellulitis of scrotum.    CT soft tissue pelvis: \"Asymmetric edema and thickening of the right hemiscrotum without soft tissue fluid collection or evidence of a necrotizing infection. Small right and trace left hydroceles.\"    Afebrile, no leukocytosis (WBC 7.7). CRP 87.27. No evidence of crepitus on exam. Started on IV ceftriaxone and vancomycin in the ED. Discontinued vancomycin with negative MRSA swab.    Clinically improving with less pain and much less tenderness. CRP " trending down.  - Continue IV ceftriaxone   - No urology consult indicated at this time  - Analgesia: acetaminophen, ibuprofen, oxycodone 5-10mg q4hrs prn, hydromorphone 0.4mg q3hrs prn     Benign essential hypertension    Previously diagnosed, blood pressure slightly elevated. Managed prior to admission with irbesartan 300 mg q hs, nifedipine ER 30 mg qhs, and terazosin 2 mg q hs.  - Continue home medications with holding parameters    HLD (hyperlipidemia)    Managed prior to admission with Zetia 10 mg q hs and Repatha injections q14 days.  - Continue Zetia  - Hold Repatha in the hospital, resume after discharge    Spinal stenosis of lumbar region with radiculopathy  S/P lumbar spinal fusion    Uses prn acetaminophen and ibuprofen for pain management, not on chronic narcotics.  - Stable, prn acetaminophen and ibuprofen available    MGUS (monoclonal gammopathy of unknown significance)    Follows with oncology through [facility] Dr. Debora England. Labs monitored q6 months.   - Continue with outpatient surveillance     CHELA (obstructive sleep apnea)    Compliant with CPAP, may continue using with home settings.         ***    Consultations This Hospital Stay   PHARMACY TO DOSE VANCO    Code Status   Full Code    The discharge plan was discussed with the patient, and he expressed understanding.     Time Spent on this Encounter   Total time on this discharge was < 30 minutes.       William Cole MD  [facility]  ______________________________________________________________________    Physical Exam   Vital Signs: Temp: 98  F (36.7  C) Temp src: Oral BP: (!) 146/69 Pulse: 61   Resp: 16 SpO2: 92 % O2 Device: None (Room air)    Weight: 285 lbs 4.4 oz  Constitutional: ***  CV: Regular  Respiratory: CTA bilaterally  GI: Soft, nontender  Skin: Warm and dry  ***       Primary Care Physician   [facility]  [address]  [facility] [address]     Discharge  "Disposition   { :7166542::\"Discharged to home\"}  Condition at discharge: {CONDITION:900735::\"Stable\"}    Significant Results and Procedures   {Data for Discharge Summary:751024}  Procedures  None***    Discharge Orders      Reason for your hospital stay    Cellulitis and epididymitis of scrotum on the right.     Activity    Your activity upon discharge: activity as tolerated     Follow Up    Follow up with Urology, on 4/17/25 as scheduled. for hospital follow- up. No follow up labs or test are needed.     Diet    Follow this diet upon discharge:  Regular Diet Adult     Discharge Medications   Current Discharge Medication List        START taking these medications    Details   sulfamethoxazole-trimethoprim (BACTRIM DS) 800-160 MG tablet Take 1 tablet by mouth 2 times daily for 7 days. Start with morning dose Monday, 4/14/2025  Qty: 14 tablet, Refills: 0    Associated Diagnoses: Cellulitis of scrotum           CONTINUE these medications which have CHANGED    Details   oxyCODONE (ROXICODONE) 5 MG tablet Take 1-2 tablets (5-10 mg) by mouth 4 times daily as needed for moderate to severe pain.  Qty: 24 tablet, Refills: 0    Associated Diagnoses: Cellulitis of scrotum           CONTINUE these medications which have NOT CHANGED    Details   acetaminophen (TYLENOL) 325 MG tablet Take 3 tablets by mouth every 6 hours as needed for mild pain.      evolocumab (REPATHA) 140 MG/ML prefilled autoinjector Inject 140 mg subcutaneously every 14 days.      ezetimibe (ZETIA) 10 MG tablet Take 1 tablet by mouth daily.      hydrocortisone 2.5 % cream APPLY TO RASH IN JESSIE UP TO TWICE DAILY AS NEEDED FOR ITCHING      ibuprofen (ADVIL,MOTRIN) 200 MG tablet Take 3 tablets by mouth every 8 hours as needed.      irbesartan (AVAPRO) 300 MG tablet Take 1 tablet by mouth daily.      NIFEdipine ER OSMOTIC (PROCARDIA XL) 30 MG 24 hr tablet Take 1 tablet by mouth daily.      terazosin (HYTRIN) 2 MG capsule Take 1 capsule by mouth at bedtime.       "     STOP taking these medications       levofloxacin (LEVAQUIN) 500 MG tablet Comments:   Reason for Stopping:             Allergies   Allergies   Allergen Reactions    Amlodipine Confusion, Other (See Comments) and Unknown    Atorvastatin Other (See Comments)    Chlorthalidone Other (See Comments)     Shortness of breath, difficulty thinking, full-body pain, fatigue    Gabapentin Other (See Comments)     Difficulty concentrating    Lisinopril Cough    Losartan Potassium-Hctz Other (See Comments)     Mouth tingling; leg weakness; slurred words    Metoprolol Other (See Comments)    Pravastatin Muscle Pain (Myalgia) and Other (See Comments)    Bupropion Hives and Rash     Took more than was prescribed    Wellbutrin       needed.      irbesartan (AVAPRO) 300 MG tablet Take 1 tablet by mouth daily.      NIFEdipine ER OSMOTIC (PROCARDIA XL) 30 MG 24 hr tablet Take 1 tablet by mouth daily.      terazosin (HYTRIN) 2 MG capsule Take 1 capsule by mouth at bedtime.           STOP taking these medications       levofloxacin (LEVAQUIN) 500 MG tablet Comments:   Reason for Stopping:             Allergies   Allergies   Allergen Reactions    Amlodipine Confusion, Other (See Comments) and Unknown    Atorvastatin Other (See Comments)    Chlorthalidone Other (See Comments)     Shortness of breath, difficulty thinking, full-body pain, fatigue    Gabapentin Other (See Comments)     Difficulty concentrating    Lisinopril Cough    Losartan Potassium-Hctz Other (See Comments)     Mouth tingling; leg weakness; slurred words    Metoprolol Other (See Comments)    Pravastatin Muscle Pain (Myalgia) and Other (See Comments)    Bupropion Hives and Rash     Took more than was prescribed    Wellbutrin

## 2025-04-13 NOTE — PROGRESS NOTES
Patient has been resting comfortably given the Meds per the MAR for the pain. He said that the pain meds are what helping him manage, not necessarily the treatment. He said it is like someone squeezing his testicle and then it shoots up in to the right side/lower back and on his thigh. He states that after the pain meds, it brings down the severity of pain to a tolerable level. He is up independently in his room. He is saline locked in the right. He has cream PRN for any itchy which he has used on this shift. Will continue to monitor.

## 2025-04-13 NOTE — PROGRESS NOTES
[facility] DISCHARGE NOTE    Patient discharged to home at 2:30 PM via wheel chair. Accompanied by spouse and staff. Discharge instructions reviewed with patient and spouse, opportunity offered to ask questions. Prescriptions sent to patients preferred pharmacy. All belongings sent with patient.    End Of Shift Note    Site still about the same in regards to redness and swelling which has been of concern to family since he has already been on IV ABX- talked through the lab values such as WBC and CRP that have been trending down. Pain overall well controlled with oxy and alternating tylenol and ibuprofen.     Walked the halls at 1030. IV ABX at noon. IV CDI.      Goal Outcome Evaluation:      Plan of Care Reviewed With: patient, spouse    Overall Patient Progress: improvingOverall Patient Progress: improving    Problem: Adult Inpatient Plan of Care  Goal: Optimal Comfort and Wellbeing  Outcome: Progressing     Problem: Infection  Goal: Absence of Infection Signs and Symptoms  Outcome: Progressing

## 2025-04-15 ENCOUNTER — PATIENT OUTREACH (OUTPATIENT)
Dept: CARE COORDINATION | Facility: CLINIC | Age: 70
End: 2025-04-15
Payer: COMMERCIAL

## 2025-04-15 NOTE — PROGRESS NOTES
[facility] Care Resource Center Contact  [facility]/Voicemail     Clinical Data: Post-Discharge Outreach     Outreach attempted x 2.  Left message on patient's voicemail, providing [facility]'s central phone number of [phone] ([phone]) for questions/concerns and/or to schedule an appt with an [facility] provider, if they do not have a PCP.      Plan:  [facility] will do no further outreaches at this time.     CHRISTINE Duff  [phone]  [facility]     *Connected Care Resource Team does NOT follow patient ongoing. Referrals are identified based on internal discharge reports and the outreach is to ensure patient has an understanding of their discharge instructions.

## 2025-04-17 ENCOUNTER — OFFICE VISIT (OUTPATIENT)
Dept: UROLOGY | Facility: CLINIC | Age: 70
End: 2025-04-17
Attending: EMERGENCY MEDICINE
Payer: COMMERCIAL

## 2025-04-17 VITALS
SYSTOLIC BLOOD PRESSURE: 120 MMHG | TEMPERATURE: 98.4 F | RESPIRATION RATE: 18 BRPM | OXYGEN SATURATION: 98 % | DIASTOLIC BLOOD PRESSURE: 70 MMHG | HEART RATE: 70 BPM

## 2025-04-17 DIAGNOSIS — N30.00 ACUTE CYSTITIS WITHOUT HEMATURIA: ICD-10-CM

## 2025-04-17 DIAGNOSIS — N49.2 CELLULITIS OF SCROTUM: ICD-10-CM

## 2025-04-17 DIAGNOSIS — R21 SCROTAL RASH: Primary | ICD-10-CM

## 2025-04-17 DIAGNOSIS — N45.1 EPIDIDYMITIS: ICD-10-CM

## 2025-04-17 RX ORDER — MELOXICAM 7.5 MG/1
7.5 TABLET ORAL DAILY
Qty: 14 TABLET | Refills: 0 | Status: SHIPPED | OUTPATIENT
Start: 2025-04-17 | End: 2025-05-01

## 2025-04-17 RX ORDER — SULFAMETHOXAZOLE AND TRIMETHOPRIM 800; 160 MG/1; MG/1
1 TABLET ORAL 2 TIMES DAILY
Qty: 10 TABLET | Refills: 0 | Status: SHIPPED | OUTPATIENT
Start: 2025-04-17 | End: 2025-04-22

## 2025-04-17 RX ORDER — CLOTRIMAZOLE AND BETAMETHASONE DIPROPIONATE 10; .64 MG/G; MG/G
CREAM TOPICAL
Qty: 45 G | Refills: 0 | Status: SHIPPED | OUTPATIENT
Start: 2025-04-17

## 2025-04-17 NOTE — PROGRESS NOTES
"Initial /70 (BP Location: Right arm)   Pulse 70   Temp 98.4  F (36.9  C) (Tympanic)   Resp 18   SpO2 98%  Estimated body mass index is 36.63 kg/m  as calculated from the following:    Height as of 1/15/25: 1.88 m (6' 2\").    Weight as of 4/11/25: 129.4 kg (285 lb 4.4 oz). .  Bladder scan = 3 ml   "

## 2025-04-17 NOTE — PROGRESS NOTES
Chief Complaint:   Epididymitis         History of Present Illness:   Alfonso Conn is a 70 year old male with a history of HTN, HLD, and CHELA who presents for evaluation of epididymitis.     The patient presented to the ED on 4/8/2025 with right scrotal pain and was found to have right epididymitis and an E.coli UTI. He was treated with levofloxacin, but returned to the ED on 4/11 with pain and swelling. CT pelvis showed edema and thickening of the right hemiscrotum without a fluid collection or evidence of necrotizing infection. He was started on IV ceftriaxone and discharged on 4/13 with a 7 day course of Bactrim.     He is doing ok. He reports significant improvement in his pain, though there continues to be some mild tenderness. The swelling has improved some, but not significantly.     He states he had a UTI in February when he was in [place]. He had a laser prostate procedure in 2009.          Past Medical History:     Past Medical History:   Diagnosis Date    Hyperlipidemia     Hypertension     MGUS (monoclonal gammopathy of unknown significance)             Past Surgical History:     Past Surgical History:   Procedure Laterality Date    AMPUTATION FINGER / THUMB      SPINAL FUSION  2009    [facility] LASER COAGULATION SURGERY PROSTATE, COMPLETE              Medications     Current Outpatient Medications   Medication Sig Dispense Refill    acetaminophen (TYLENOL) 325 MG tablet Take 3 tablets by mouth every 6 hours as needed for mild pain.      evolocumab (REPATHA) 140 MG/ML prefilled autoinjector Inject 140 mg subcutaneously every 14 days.      ezetimibe (ZETIA) 10 MG tablet Take 1 tablet by mouth daily.      hydrocortisone 2.5 % cream APPLY TO RASH IN JESSIE UP TO TWICE DAILY AS NEEDED FOR ITCHING      ibuprofen (ADVIL,MOTRIN) 200 MG tablet Take 3 tablets by mouth every 8 hours as needed.      irbesartan (AVAPRO) 300 MG tablet Take 1 tablet by mouth daily.      NIFEdipine ER OSMOTIC (PROCARDIA XL) 30 MG 24  hr tablet Take 1 tablet by mouth daily.      oxyCODONE (ROXICODONE) 5 MG tablet Take 1-2 tablets (5-10 mg) by mouth 4 times daily as needed for moderate to severe pain. 24 tablet 0    sulfamethoxazole-trimethoprim (BACTRIM DS) 800-160 MG tablet Take 1 tablet by mouth 2 times daily for 7 days. Start with morning dose Monday, 4/14/2025 14 tablet 0    terazosin (HYTRIN) 2 MG capsule Take 1 capsule by mouth at bedtime.       No current facility-administered medications for this visit.            Allergies:   Amlodipine, Atorvastatin, Chlorthalidone, Gabapentin, Lisinopril, Losartan potassium-hctz, Metoprolol, Pravastatin, and Bupropion         Review of Systems:  From intake questionnaire   Negative 14 system review except as noted on HPI, nurse's note.         Physical Exam:   Patient is a 70 year old  male   Vitals: Blood pressure 120/70, pulse 70, temperature 98.4  F (36.9  C), temperature source Tympanic, resp. rate 18, SpO2 98%.  General Appearance Adult: Alert, no acute distress, oriented.  Lungs: Non-labored breathing.  Heart: No obvious jugular venous distension present.  Neuro: Alert, oriented, speech and mentation normal  : mild erythema diffusely present over the scrotum, left testicle and epididymis normal, right hemiscrotum edematous with pain to palpation, no crepitus or fluctuance    PVR: 3 mL      Labs and Pathology:    I personally reviewed all applicable laboratory data and went over findings with patient  Significant for:    CBC RESULTS:  Recent Labs   Lab Test 04/12/25  0501 04/11/25  1320 04/08/25  0815 10/24/24  0946   WBC 7.5 7.7 12.4* 6.1   HGB 11.5* 11.4* 12.7* 13.7    312 282 302        BMP RESULTS:  Recent Labs   Lab Test 04/12/25  0501 04/11/25  1226 04/08/25  0815 10/24/24  0946 08/16/19  0529 08/15/19  0953    138 137 138 138 137   POTASSIUM 3.7 4.2 4.0 4.1 3.8 4.0   CHLORIDE 104 103 104 103 105 104   CO2 22 24 25 28 27 24   ANIONGAP 12 11 8 7 6 9   GLC 99 87 103* 99 100 104    BUN 9.2 12.7 15.2 13.6 12 15   CR 0.88 0.83 0.90 0.85 0.86 0.87   GFRESTIMATED >90 >90 >90 >90 >60 >60   GFRESTBLACK  --   --   --   --  >60 >60   MALIKA 8.3* 8.8 8.9 9.0 8.7 9.1       UA RESULTS:   Recent Labs   Lab Test 04/08/25  0946   SG 1.024   URINEPH 6.5   NITRITE Negative   RBCU 4*   WBCU 26*         Imaging:    I personally reviewed all applicable imaging and went over findings with patient.  Significant for:    Results for orders placed or performed during the hospital encounter of 04/11/25   CT Pelvis Soft Tissue w Contrast    Narrative    EXAM: CT PELVIS SOFT TISSUE W CONTRAST  LOCATION: [facility]  DATE: 4/11/2025    INDICATION: Worsening right scrotal swelling x 3d. Recent dx of epididymitis and cystitis.  Concern for scrotal cellulitis evaluate for necrotizing soft tissue infection  COMPARISON: Scrotal ultrasound 4/8/2025, CT 10/24/2025  TECHNIQUE: CT scan of the pelvis was performed with IV contrast. Multiplanar reformats were obtained. Dose reduction techniques were used.  CONTRAST: 80 mL Isovue 370     FINDINGS:    PELVIC ORGANS: Asymmetric edema and thickening of the right hemiscrotum without distinct fluid collection. No subcutaneous emphysema to suggest a necrotizing infection. Small right and trace left hydroceles. Normal prostate gland and seminal vesicles.   Trace right fat-containing inguinal hernia. No fat stranding within the inguinal canals. Normal inguinal and perianal soft tissues.    ADDITIONAL FINDINGS: Chronic sclerosis and osseous changes of the lower lumbar spine and sacrum without interval change. This is likely related to removed/revised hardware. No interval change. Anterior instrumented lumbosacral fusion hardware at L5-S1   and interbody fusion at L4-L5. The bowel is nondistended. Normal appendix. Colonic diverticulosis. No pelvic ascites or lymphadenopathy. Tiny fat-containing umbilical hernia.      Impression    IMPRESSION:  1.  Asymmetric edema  and thickening of the right hemiscrotum without soft tissue fluid collection or evidence of a necrotizing infection.  2.  Small right and trace left hydroceles.                  Assessment and Plan:     Assessment: 70 year old male seen in evaluation for epididymitis.     The patient presented to the ED on 4/8/2025 with right scrotal pain and was found to have right epididymitis and an E.coli UTI. He was treated with levofloxacin, but returned to the ED on 4/11 with pain and swelling. CT pelvis showed edema and thickening of the right hemiscrotum without a fluid collection or evidence of necrotizing infection. He was started on IV ceftriaxone and discharged on 4/13 with a 7 day course of Bactrim.     He is doing ok. He reports significant improvement in his pain, though there continues to be some mild tenderness. The swelling has improved some, but not significantly. He denies any bothersome urinary symptoms.     He states he had a UTI in February when he was in [place]. He had a laser prostate procedure in 2009.     I would like to extend his course of Bactrim by five days and start meloxicam daily for pain. We discussed that the scrotal swelling can take up to six weeks to resolve.     Given the frequent UTIs, could consider cystoscopy for evaluation. He would like to hold off on evaluation at this time.     Plan:  Extend Bactrim course until 4/25.  Start meloxicam 7.5 mg daily x 14 days. Do not take other NSAIDs.   Patient will contact me next week if pain is not continuing to improve.     Tara Iyer PA-C  Department of Urology         Not applicable

## 2025-06-18 ENCOUNTER — LAB (OUTPATIENT)
Dept: LAB | Facility: CLINIC | Age: 70
End: 2025-06-18
Payer: COMMERCIAL

## 2025-06-18 ENCOUNTER — TELEPHONE (OUTPATIENT)
Dept: UROLOGY | Facility: CLINIC | Age: 70
End: 2025-06-18
Payer: COMMERCIAL

## 2025-06-18 DIAGNOSIS — N30.00 ACUTE CYSTITIS WITHOUT HEMATURIA: Primary | ICD-10-CM

## 2025-06-18 DIAGNOSIS — N30.00 ACUTE CYSTITIS WITHOUT HEMATURIA: ICD-10-CM

## 2025-06-18 LAB
ALBUMIN UR-MCNC: NEGATIVE MG/DL
APPEARANCE UR: CLEAR
BACTERIA #/AREA URNS HPF: ABNORMAL /HPF
BILIRUB UR QL STRIP: NEGATIVE
COLOR UR AUTO: YELLOW
GLUCOSE UR STRIP-MCNC: NEGATIVE MG/DL
HGB UR QL STRIP: ABNORMAL
KETONES UR STRIP-MCNC: NEGATIVE MG/DL
LEUKOCYTE ESTERASE UR QL STRIP: ABNORMAL
NITRATE UR QL: NEGATIVE
PH UR STRIP: 6 [PH] (ref 5–7)
RBC #/AREA URNS AUTO: ABNORMAL /HPF
SP GR UR STRIP: 1.01 (ref 1–1.03)
UROBILINOGEN UR STRIP-ACNC: 0.2 E.U./DL
WBC #/AREA URNS AUTO: ABNORMAL /HPF

## 2025-06-18 PROCEDURE — 81001 URINALYSIS AUTO W/SCOPE: CPT

## 2025-06-18 PROCEDURE — 87186 SC STD MICRODIL/AGAR DIL: CPT

## 2025-06-18 PROCEDURE — 87086 URINE CULTURE/COLONY COUNT: CPT

## 2025-06-18 NOTE — TELEPHONE ENCOUNTER
[facility] Call Center    Phone Message    May a detailed message be left on voicemail: yes     Reason for Call: Other: pt thinks he has a UTI, has had them before, please reach out to pt     Action Taken: Other: urology    Travel Screening: Not Applicable     Date of Service:

## 2025-06-18 NOTE — TELEPHONE ENCOUNTER
Patient was transferred to NB triage RN to schedule lab only appt. Lab appt scheduled.   Kristin Rouse RN on 6/18/2025 at 9:54 AM

## 2025-06-18 NOTE — TELEPHONE ENCOUNTER
Call placed to Patient for triage.  Urgency, frequency, tip of penis burning at the end of urination.  Has chronic back pain (no kidney pain) afebrile.  Did lessen with increased water consumption.    Patient informed on lab only process and advice for self care and cautions, as well as can take up to 72 hours for Culture and sensitivities.  Lab orders placed.  Patient will push fluids.    Lita DOCKERY   [facility]

## 2025-06-19 LAB — BACTERIA UR CULT: ABNORMAL

## 2025-06-20 ENCOUNTER — RESULTS FOLLOW-UP (OUTPATIENT)
Dept: UROLOGY | Facility: CLINIC | Age: 70
End: 2025-06-20

## 2025-06-29 ENCOUNTER — HOSPITAL ENCOUNTER (EMERGENCY)
Facility: CLINIC | Age: 70
Discharge: HOME | End: 2025-06-29
Payer: COMMERCIAL

## 2025-06-29 VITALS
HEART RATE: 64 BPM | DIASTOLIC BLOOD PRESSURE: 77 MMHG | TEMPERATURE: 97.8 F | OXYGEN SATURATION: 98 % | SYSTOLIC BLOOD PRESSURE: 147 MMHG | RESPIRATION RATE: 18 BRPM

## 2025-06-29 DIAGNOSIS — S61.219A FINGER LACERATION: ICD-10-CM

## 2025-06-29 PROCEDURE — 12001 RPR S/N/AX/GEN/TRNK 2.5CM/<: CPT | Mod: F1

## 2025-06-29 PROCEDURE — G0463 HOSPITAL OUTPT CLINIC VISIT: HCPCS | Mod: 25

## 2025-06-29 ASSESSMENT — COLUMBIA-SUICIDE SEVERITY RATING SCALE - C-SSRS
6. HAVE YOU EVER DONE ANYTHING, STARTED TO DO ANYTHING, OR PREPARED TO DO ANYTHING TO END YOUR LIFE?: NO
1. IN THE PAST MONTH, HAVE YOU WISHED YOU WERE DEAD OR WISHED YOU COULD GO TO SLEEP AND NOT WAKE UP?: NO
2. HAVE YOU ACTUALLY HAD ANY THOUGHTS OF KILLING YOURSELF IN THE PAST MONTH?: NO

## 2025-06-29 NOTE — DISCHARGE INSTRUCTIONS
Follow-up with your regular doctor or Urgent Care to have the stitches removed in the next 7 to 10 days.  Keep the wound clean and dry.  You can shower and wash it with soap and water.  No hot tubs, swimming or soaking until the wound is healed.    You can use Tylenol or ibuprofen for pain.  Return to the ER if you develop any fever, pus coming from the wound, severe pain or any other new or concerning symptoms.

## 2025-06-29 NOTE — ED PROVIDER NOTES
History     Chief Complaint   Patient presents with    Laceration     Pt reports a couple hours ago he was cutting thick rubber and the scissors slipped and took a chunk out of knuckle of pointer finger on left hand.     HPI  Alfonso Conn is a 70 year old male who presents urgent care with chief complaint of laceration to left index finger.  Patient reports he was cutting thick rubber with a pair of scissors when he accidentally cut a chunk out of his left index finger over the dorsal PIP joint.  Bleeding well-controlled.  Patient not on blood thinners.  Patient reports full range of motion of finger and no decrease sensation.    Allergies:  Allergies   Allergen Reactions    Amlodipine Confusion, Other (See Comments) and Unknown    Atorvastatin Other (See Comments)    Chlorthalidone Other (See Comments)     Shortness of breath, difficulty thinking, full-body pain, fatigue    Gabapentin Other (See Comments)     Difficulty concentrating    Lisinopril Cough    Losartan Potassium-Hctz Other (See Comments)     Mouth tingling; leg weakness; slurred words    Metoprolol Other (See Comments)    Pravastatin Muscle Pain (Myalgia) and Other (See Comments)    Bupropion Hives and Rash     Took more than was prescribed    Wellbutrin       Problem List:    Patient Active Problem List    Diagnosis Date Noted    Cellulitis of scrotum 04/11/2025     Priority: Medium    Abnormal CT scan 04/11/2025     Priority: Medium    E-coli UTI 04/11/2025     Priority: Medium    Abdominal aortic ectasia 03/23/2021     Priority: Medium     3.1 cm 2021, 2.9 cm 2024, recommended repeat US in 10 years.      History of vertebral fracture 12/31/2020     Priority: Medium     Likely due to trauma (retired )      MGUS (monoclonal gammopathy of unknown significance) 12/31/2020     Priority: Medium     surveillance every 12 months with CBC, BMP and SPEP and provider visit.      Neuropathy 12/31/2020     Priority: Medium     Saw neurology.  No underlying cause, except for possible association with MGUS. Cymbalta is helping. Side effects from gabapentin and pregabalin. Could add amitriptyline if needed.      Palpitations 08/15/2019     Priority: Medium    Benign essential hypertension 08/15/2019     Priority: Medium    BPH (benign prostatic hyperplasia) 2019     Priority: Medium    Spinal stenosis of lumbar region with radiculopathy 2017     Priority: Medium    S/P lumbar spinal fusion 2017     Priority: Medium    Chronic back pain 2016     Priority: Medium    Fatty liver 2012     Priority: Medium    HLD (hyperlipidemia) 2012     Priority: Medium    HTN (hypertension) 2012     Priority: Medium    CHELA (obstructive sleep apnea) 10/11/2010     Priority: Medium     Mild CHELA (obstructive sleep apnea)          Past Medical History:    Past Medical History:   Diagnosis Date    Hyperlipidemia     Hypertension     MGUS (monoclonal gammopathy of unknown significance)        Past Surgical History:    Past Surgical History:   Procedure Laterality Date    AMPUTATION FINGER / THUMB      SPINAL FUSION      ZZC LASER COAGULATION SURGERY PROSTATE, COMPLETE         Family History:    Family History   Problem Relation Age of Onset    Cerebrovascular Disease Mother     Cancer Mother     Cancer Brother        Social History:  Marital Status:   [2]  Social History     Tobacco Use    Smoking status: Former     Current packs/day: 0.00     Types: Cigarettes     Quit date:      Years since quittin.5    Smokeless tobacco: Never   Substance Use Topics    Alcohol use: Never    Drug use: Never        Medications:    acetaminophen (TYLENOL) 325 MG tablet  clotrimazole-betamethasone (LOTRISONE) 1-0.05 % external cream  evolocumab (REPATHA) 140 MG/ML prefilled autoinjector  ezetimibe (ZETIA) 10 MG tablet  hydrocortisone 2.5 % cream  ibuprofen (ADVIL,MOTRIN) 200 MG tablet  irbesartan (AVAPRO) 300 MG tablet  meloxicam (MOBIC)  7.5 MG tablet  NIFEdipine ER OSMOTIC (PROCARDIA XL) 30 MG 24 hr tablet  oxyCODONE (ROXICODONE) 5 MG tablet  terazosin (HYTRIN) 2 MG capsule          Review of Systems   All other systems reviewed and are negative.      Physical Exam   BP: (!) 147/77  Pulse: 64  Temp: 97.8  F (36.6  C)  Resp: 18  SpO2: 98 %      Physical Exam  Vitals and nursing note reviewed.   Constitutional:       General: He is not in acute distress.     Appearance: Normal appearance. He is not ill-appearing or toxic-appearing.   HENT:      Head: Normocephalic.   Cardiovascular:      Rate and Rhythm: Normal rate.      Pulses: Normal pulses.   Pulmonary:      Effort: Pulmonary effort is normal.   Musculoskeletal:      Comments: Full range of motion of left index finger   Skin:     Comments: 2.5 cm long laceration to the dorsal PIP joint of the left index finger.   Neurological:      Mental Status: He is alert.   Psychiatric:         Mood and Affect: Mood normal.         Behavior: Behavior normal.         ED Course        [facility]    -Laceration Repair    Date/Time: 6/29/2025 4:04 PM    Performed by: Shelia Bush PA-C  Authorized by: Shelia Bush PA-C    Risks, benefits and alternatives discussed.      ANESTHESIA (see MAR for exact dosages):     Anesthesia method:  Local infiltration    Local anesthetic:  Bupivacaine 0.25% w/o epi  LACERATION DETAILS     Location:  Finger    Finger location:  L index finger    Length (cm):  2.5    REPAIR TYPE:     Repair type:  Simple    EXPLORATION:     Wound exploration: wound explored through full range of motion and entire depth of wound probed and visualized      Wound extent: areolar tissue not violated, fascia not violated, no tendon damage and no vascular damage      TREATMENT:     Area cleansed with:  Hibiclens    Amount of cleaning:  Extensive    Irrigation solution:  Tap water    Irrigation method:  Syringe    Visualized foreign bodies/material removed: no       SKIN REPAIR     Repair method:  Sutures    Suture size:  4-0    Wound skin closure material used: Ethilon.    Suture technique:  Simple interrupted and horizontal mattress (3 simple interrupted, 2 horizontal mattress)    Number of sutures:  5    APPROXIMATION     Approximation:  Close    POST-PROCEDURE DETAILS     Dressing:  Antibiotic ointment and adhesive bandage      PROCEDURE    Patient Tolerance:  Patient tolerated the procedure well with no immediate complications          No results found for this or any previous visit (from the past 24 hours).    Medications - No data to display    Assessments & Plan (with Medical Decision Making)     I have reviewed the nursing notes.    I have reviewed the findings, diagnosis, plan and need for follow up with the patient.        Medical Decision Making   70 year old male who presents urgent care with chief complaint of laceration to left index finger.  Patient reports he was cutting thick rubber with a pair of scissors when he accidentally cut a chunk out of his left index finger over the dorsal PIP joint.  Bleeding well-controlled.  Patient not on blood thinners.  Patient reports full range of motion of finger and no decrease sensation.    Patient is up-to-date on Tdap.      On exam,2.5 cm long laceration to the dorsal PIP joint of the left index finger.  Patient has full range of motion and sensation of the left index finger.    Procedure as stated above.  5 total sutures placed patient tolerated well.    Plan:Follow-up with your regular doctor or Urgent Care to have the stitches removed in the next 7 to 10 days.  Keep the wound clean and dry.  You can shower and wash it with soap and water.  No hot tubs, swimming or soaking until the wound is healed.    You can use Tylenol or ibuprofen for pain.  Return to the ER if you develop any fever, pus coming from the wound, severe pain or any other new or concerning symptoms.      Prior to making a final disposition on this  patient the results of patient's tests and other diagnostic studies were discussed with the patient. All questions were answered. Patient expressed understanding of the plan and was amenable to it.     Disclaimer: This note consists of symbols derived from keyboarding, dictation and/or voice recognition software. As a result, there may be errors in the script that have gone undetected. Please consider this when interpreting information found in this chart.        Discharge Medication List as of 6/29/2025  4:02 PM          Final diagnoses:   Finger laceration       6/29/2025   [facility] EMERGENCY DEPT       Shelia Bush PA-C  06/29/25 [id number]

## 2025-06-30 ENCOUNTER — HOSPITAL ENCOUNTER (OUTPATIENT)
Dept: MRI IMAGING | Facility: CLINIC | Age: 70
Discharge: HOME | End: 2025-06-30
Attending: ORTHOPAEDIC SURGERY | Admitting: ORTHOPAEDIC SURGERY
Payer: COMMERCIAL

## 2025-06-30 DIAGNOSIS — M25.562 LEFT KNEE PAIN: ICD-10-CM

## 2025-06-30 PROCEDURE — 73721 MRI JNT OF LWR EXTRE W/O DYE: CPT | Mod: LT
